# Patient Record
Sex: MALE | Race: WHITE | NOT HISPANIC OR LATINO | Employment: FULL TIME | URBAN - NONMETROPOLITAN AREA
[De-identification: names, ages, dates, MRNs, and addresses within clinical notes are randomized per-mention and may not be internally consistent; named-entity substitution may affect disease eponyms.]

---

## 2019-02-22 ENCOUNTER — TRANSCRIBE ORDERS (OUTPATIENT)
Dept: ADMINISTRATIVE | Facility: HOSPITAL | Age: 49
End: 2019-02-22

## 2019-02-22 DIAGNOSIS — M51.36 DEGENERATION OF LUMBAR INTERVERTEBRAL DISC: Primary | ICD-10-CM

## 2019-02-26 ENCOUNTER — APPOINTMENT (OUTPATIENT)
Dept: MRI IMAGING | Facility: HOSPITAL | Age: 49
End: 2019-02-26

## 2021-04-18 ENCOUNTER — HOSPITAL ENCOUNTER (INPATIENT)
Facility: HOSPITAL | Age: 51
LOS: 2 days | Discharge: HOME | End: 2021-04-21
Attending: FAMILY MEDICINE | Admitting: SURGERY

## 2021-04-18 ENCOUNTER — APPOINTMENT (OUTPATIENT)
Dept: CT IMAGING | Facility: HOSPITAL | Age: 51
End: 2021-04-18

## 2021-04-18 DIAGNOSIS — K56.609 SMALL BOWEL OBSTRUCTION (HCC): Primary | ICD-10-CM

## 2021-04-18 LAB
ALBUMIN SERPL-MCNC: 3.93 G/DL (ref 3.5–5.2)
ALBUMIN/GLOB SERPL: 1.1 G/DL
ALP SERPL-CCNC: 75 U/L (ref 39–117)
ALT SERPL W P-5'-P-CCNC: 26 U/L (ref 1–41)
ANION GAP SERPL CALCULATED.3IONS-SCNC: 8.8 MMOL/L (ref 5–15)
AST SERPL-CCNC: 22 U/L (ref 1–40)
BASOPHILS # BLD AUTO: 0.05 10*3/MM3 (ref 0–0.2)
BASOPHILS NFR BLD AUTO: 0.5 % (ref 0–1.5)
BILIRUB SERPL-MCNC: 1 MG/DL (ref 0–1.2)
BUN SERPL-MCNC: 11 MG/DL (ref 6–20)
BUN/CREAT SERPL: 9.6 (ref 7–25)
CALCIUM SPEC-SCNC: 9.1 MG/DL (ref 8.6–10.5)
CHLORIDE SERPL-SCNC: 102 MMOL/L (ref 98–107)
CO2 SERPL-SCNC: 30.2 MMOL/L (ref 22–29)
CREAT SERPL-MCNC: 1.15 MG/DL (ref 0.76–1.27)
DEPRECATED RDW RBC AUTO: 42.6 FL (ref 37–54)
EOSINOPHIL # BLD AUTO: 0.12 10*3/MM3 (ref 0–0.4)
EOSINOPHIL NFR BLD AUTO: 1.2 % (ref 0.3–6.2)
ERYTHROCYTE [DISTWIDTH] IN BLOOD BY AUTOMATED COUNT: 13.5 % (ref 12.3–15.4)
FLUAV RNA RESP QL NAA+PROBE: NOT DETECTED
FLUBV RNA RESP QL NAA+PROBE: NOT DETECTED
GFR SERPL CREATININE-BSD FRML MDRD: 68 ML/MIN/1.73
GLOBULIN UR ELPH-MCNC: 3.5 GM/DL
GLUCOSE SERPL-MCNC: 106 MG/DL (ref 65–99)
HCT VFR BLD AUTO: 53 % (ref 37.5–51)
HGB BLD-MCNC: 17.2 G/DL (ref 13–17.7)
HOLD SPECIMEN: NORMAL
HOLD SPECIMEN: NORMAL
IMM GRANULOCYTES # BLD AUTO: 0.03 10*3/MM3 (ref 0–0.05)
IMM GRANULOCYTES NFR BLD AUTO: 0.3 % (ref 0–0.5)
INR PPP: 0.97 (ref 0.9–1.1)
LIPASE SERPL-CCNC: 19 U/L (ref 13–60)
LYMPHOCYTES # BLD AUTO: 1.43 10*3/MM3 (ref 0.7–3.1)
LYMPHOCYTES NFR BLD AUTO: 14.3 % (ref 19.6–45.3)
MCH RBC QN AUTO: 28.1 PG (ref 26.6–33)
MCHC RBC AUTO-ENTMCNC: 32.5 G/DL (ref 31.5–35.7)
MCV RBC AUTO: 86.6 FL (ref 79–97)
MONOCYTES # BLD AUTO: 0.67 10*3/MM3 (ref 0.1–0.9)
MONOCYTES NFR BLD AUTO: 6.7 % (ref 5–12)
NEUTROPHILS NFR BLD AUTO: 7.72 10*3/MM3 (ref 1.7–7)
NEUTROPHILS NFR BLD AUTO: 77 % (ref 42.7–76)
NRBC BLD AUTO-RTO: 0 /100 WBC (ref 0–0.2)
PLATELET # BLD AUTO: 265 10*3/MM3 (ref 140–450)
PMV BLD AUTO: 10.4 FL (ref 6–12)
POTASSIUM SERPL-SCNC: 3.6 MMOL/L (ref 3.5–5.2)
PROT SERPL-MCNC: 7.4 G/DL (ref 6–8.5)
PROTHROMBIN TIME: 12.7 SECONDS (ref 11.9–14.1)
RBC # BLD AUTO: 6.12 10*6/MM3 (ref 4.14–5.8)
SARS-COV-2 RNA RESP QL NAA+PROBE: NOT DETECTED
SODIUM SERPL-SCNC: 141 MMOL/L (ref 136–145)
WBC # BLD AUTO: 10.02 10*3/MM3 (ref 3.4–10.8)
WHOLE BLOOD HOLD SPECIMEN: NORMAL
WHOLE BLOOD HOLD SPECIMEN: NORMAL

## 2021-04-18 PROCEDURE — 99284 EMERGENCY DEPT VISIT MOD MDM: CPT

## 2021-04-18 PROCEDURE — 85025 COMPLETE CBC W/AUTO DIFF WBC: CPT | Performed by: FAMILY MEDICINE

## 2021-04-18 PROCEDURE — 85610 PROTHROMBIN TIME: CPT | Performed by: FAMILY MEDICINE

## 2021-04-18 PROCEDURE — 25010000002 IOPAMIDOL 61 % SOLUTION: Performed by: FAMILY MEDICINE

## 2021-04-18 PROCEDURE — 83690 ASSAY OF LIPASE: CPT | Performed by: FAMILY MEDICINE

## 2021-04-18 PROCEDURE — 80053 COMPREHEN METABOLIC PANEL: CPT | Performed by: FAMILY MEDICINE

## 2021-04-18 PROCEDURE — 25010000002 ONDANSETRON PER 1 MG: Performed by: FAMILY MEDICINE

## 2021-04-18 PROCEDURE — 74177 CT ABD & PELVIS W/CONTRAST: CPT

## 2021-04-18 PROCEDURE — 87636 SARSCOV2 & INF A&B AMP PRB: CPT | Performed by: FAMILY MEDICINE

## 2021-04-18 PROCEDURE — 36415 COLL VENOUS BLD VENIPUNCTURE: CPT

## 2021-04-18 RX ORDER — ONDANSETRON 2 MG/ML
4 INJECTION INTRAMUSCULAR; INTRAVENOUS ONCE
Status: COMPLETED | OUTPATIENT
Start: 2021-04-18 | End: 2021-04-18

## 2021-04-18 RX ORDER — SODIUM CHLORIDE 0.9 % (FLUSH) 0.9 %
10 SYRINGE (ML) INJECTION AS NEEDED
Status: DISCONTINUED | OUTPATIENT
Start: 2021-04-18 | End: 2021-04-21 | Stop reason: HOSPADM

## 2021-04-18 RX ADMIN — ONDANSETRON 4 MG: 2 INJECTION INTRAMUSCULAR; INTRAVENOUS at 21:57

## 2021-04-18 RX ADMIN — SODIUM CHLORIDE 500 ML: 9 INJECTION, SOLUTION INTRAVENOUS at 21:43

## 2021-04-18 RX ADMIN — IOPAMIDOL 88 ML: 612 INJECTION, SOLUTION INTRAVENOUS at 23:27

## 2021-04-19 ENCOUNTER — APPOINTMENT (OUTPATIENT)
Dept: CARDIOLOGY | Facility: HOSPITAL | Age: 51
End: 2021-04-19

## 2021-04-19 PROBLEM — K56.609 SMALL BOWEL OBSTRUCTION (HCC): Status: ACTIVE | Noted: 2021-04-19

## 2021-04-19 LAB
ANION GAP SERPL CALCULATED.3IONS-SCNC: 8.7 MMOL/L (ref 5–15)
BH CV ECHO MEAS - ACS: 2.9 CM
BH CV ECHO MEAS - AO ROOT AREA (BSA CORRECTED): 1.4
BH CV ECHO MEAS - AO ROOT AREA: 9.9 CM^2
BH CV ECHO MEAS - AO ROOT DIAM: 3.6 CM
BH CV ECHO MEAS - BSA(HAYCOCK): 2.7 M^2
BH CV ECHO MEAS - BSA: 2.6 M^2
BH CV ECHO MEAS - BZI_BMI: 35.3 KILOGRAMS/M^2
BH CV ECHO MEAS - BZI_METRIC_HEIGHT: 193 CM
BH CV ECHO MEAS - BZI_METRIC_WEIGHT: 131.5 KG
BH CV ECHO MEAS - EDV(CUBED): 64 ML
BH CV ECHO MEAS - EDV(MOD-SP4): 107 ML
BH CV ECHO MEAS - EDV(TEICH): 70 ML
BH CV ECHO MEAS - EF(CUBED): -24.2 %
BH CV ECHO MEAS - EF(MOD-SP4): 20.7 %
BH CV ECHO MEAS - EF(TEICH): -18.7 %
BH CV ECHO MEAS - ESV(CUBED): 79.5 ML
BH CV ECHO MEAS - ESV(MOD-SP4): 84.9 ML
BH CV ECHO MEAS - ESV(TEICH): 83.1 ML
BH CV ECHO MEAS - FS: -7.5 %
BH CV ECHO MEAS - IVS/LVPW: 1
BH CV ECHO MEAS - IVSD: 1.6 CM
BH CV ECHO MEAS - LA DIMENSION: 4.7 CM
BH CV ECHO MEAS - LA/AO: 1.3
BH CV ECHO MEAS - LV DIASTOLIC VOL/BSA (35-75): 41.2 ML/M^2
BH CV ECHO MEAS - LV MASS(C)D: 257.9 GRAMS
BH CV ECHO MEAS - LV MASS(C)DI: 99.4 GRAMS/M^2
BH CV ECHO MEAS - LV SYSTOLIC VOL/BSA (12-30): 32.7 ML/M^2
BH CV ECHO MEAS - LVIDD: 4 CM
BH CV ECHO MEAS - LVIDS: 4.3 CM
BH CV ECHO MEAS - LVLD AP4: 8.5 CM
BH CV ECHO MEAS - LVLS AP4: 8.2 CM
BH CV ECHO MEAS - LVOT AREA (M): 4.5 CM^2
BH CV ECHO MEAS - LVOT AREA: 4.5 CM^2
BH CV ECHO MEAS - LVOT DIAM: 2.4 CM
BH CV ECHO MEAS - LVPWD: 1.6 CM
BH CV ECHO MEAS - MV A MAX VEL: 52.7 CM/SEC
BH CV ECHO MEAS - MV E MAX VEL: 86.1 CM/SEC
BH CV ECHO MEAS - MV E/A: 1.6
BH CV ECHO MEAS - PA ACC TIME: 0.06 SEC
BH CV ECHO MEAS - PA PR(ACCEL): 52 MMHG
BH CV ECHO MEAS - SI(CUBED): -6 ML/M^2
BH CV ECHO MEAS - SI(MOD-SP4): 8.5 ML/M^2
BH CV ECHO MEAS - SI(TEICH): -5 ML/M^2
BH CV ECHO MEAS - SV(CUBED): -15.5 ML
BH CV ECHO MEAS - SV(MOD-SP4): 22.1 ML
BH CV ECHO MEAS - SV(TEICH): -13.1 ML
BILIRUB UR QL STRIP: ABNORMAL
BUN SERPL-MCNC: 10 MG/DL (ref 6–20)
BUN/CREAT SERPL: 9.8 (ref 7–25)
CALCIUM SPEC-SCNC: 8.4 MG/DL (ref 8.6–10.5)
CHLORIDE SERPL-SCNC: 107 MMOL/L (ref 98–107)
CLARITY UR: CLEAR
CO2 SERPL-SCNC: 24.3 MMOL/L (ref 22–29)
COLOR UR: ABNORMAL
CREAT SERPL-MCNC: 1.02 MG/DL (ref 0.76–1.27)
D-LACTATE SERPL-SCNC: 0.9 MMOL/L (ref 0.5–2)
DEPRECATED RDW RBC AUTO: 43.7 FL (ref 37–54)
ERYTHROCYTE [DISTWIDTH] IN BLOOD BY AUTOMATED COUNT: 13.7 % (ref 12.3–15.4)
GFR SERPL CREATININE-BSD FRML MDRD: 78 ML/MIN/1.73
GLUCOSE SERPL-MCNC: 103 MG/DL (ref 65–99)
GLUCOSE UR STRIP-MCNC: NEGATIVE MG/DL
HCT VFR BLD AUTO: 48.5 % (ref 37.5–51)
HGB BLD-MCNC: 15.9 G/DL (ref 13–17.7)
HGB UR QL STRIP.AUTO: NEGATIVE
KETONES UR QL STRIP: ABNORMAL
LEUKOCYTE ESTERASE UR QL STRIP.AUTO: NEGATIVE
MAXIMAL PREDICTED HEART RATE: 172 BPM
MCH RBC QN AUTO: 28.8 PG (ref 26.6–33)
MCHC RBC AUTO-ENTMCNC: 32.8 G/DL (ref 31.5–35.7)
MCV RBC AUTO: 87.7 FL (ref 79–97)
NITRITE UR QL STRIP: NEGATIVE
PH UR STRIP.AUTO: 5.5 [PH] (ref 5–8)
PLATELET # BLD AUTO: 379 10*3/MM3 (ref 140–450)
PMV BLD AUTO: 11.7 FL (ref 6–12)
POTASSIUM SERPL-SCNC: 3.6 MMOL/L (ref 3.5–5.2)
PROT UR QL STRIP: NEGATIVE
QT INTERVAL: 358 MS
QTC INTERVAL: 397 MS
RBC # BLD AUTO: 5.53 10*6/MM3 (ref 4.14–5.8)
SODIUM SERPL-SCNC: 140 MMOL/L (ref 136–145)
SP GR UR STRIP: >1.03 (ref 1–1.03)
STRESS TARGET HR: 146 BPM
UROBILINOGEN UR QL STRIP: ABNORMAL
WBC # BLD AUTO: 8.57 10*3/MM3 (ref 3.4–10.8)

## 2021-04-19 PROCEDURE — 94799 UNLISTED PULMONARY SVC/PX: CPT

## 2021-04-19 PROCEDURE — 81003 URINALYSIS AUTO W/O SCOPE: CPT | Performed by: FAMILY MEDICINE

## 2021-04-19 PROCEDURE — 94660 CPAP INITIATION&MGMT: CPT

## 2021-04-19 PROCEDURE — 99223 1ST HOSP IP/OBS HIGH 75: CPT | Performed by: SURGERY

## 2021-04-19 PROCEDURE — 93306 TTE W/DOPPLER COMPLETE: CPT | Performed by: INTERNAL MEDICINE

## 2021-04-19 PROCEDURE — 93306 TTE W/DOPPLER COMPLETE: CPT

## 2021-04-19 PROCEDURE — 85027 COMPLETE CBC AUTOMATED: CPT | Performed by: SURGERY

## 2021-04-19 PROCEDURE — 99254 IP/OBS CNSLTJ NEW/EST MOD 60: CPT | Performed by: SPECIALIST

## 2021-04-19 PROCEDURE — 25010000002 HEPARIN (PORCINE) PER 1000 UNITS: Performed by: SURGERY

## 2021-04-19 PROCEDURE — 25010000002 VANCOMYCIN 5 G RECONSTITUTED SOLUTION: Performed by: FAMILY MEDICINE

## 2021-04-19 PROCEDURE — 87040 BLOOD CULTURE FOR BACTERIA: CPT | Performed by: FAMILY MEDICINE

## 2021-04-19 PROCEDURE — 93005 ELECTROCARDIOGRAM TRACING: CPT | Performed by: SURGERY

## 2021-04-19 PROCEDURE — 80048 BASIC METABOLIC PNL TOTAL CA: CPT | Performed by: SURGERY

## 2021-04-19 PROCEDURE — 83605 ASSAY OF LACTIC ACID: CPT | Performed by: FAMILY MEDICINE

## 2021-04-19 RX ORDER — MORPHINE SULFATE 2 MG/ML
2 INJECTION, SOLUTION INTRAMUSCULAR; INTRAVENOUS EVERY 4 HOURS PRN
Status: DISCONTINUED | OUTPATIENT
Start: 2021-04-19 | End: 2021-04-21 | Stop reason: HOSPADM

## 2021-04-19 RX ORDER — GUSELKUMAB 100 MG/ML
100 INJECTION SUBCUTANEOUS TAKE AS DIRECTED
COMMUNITY
Start: 2021-04-21

## 2021-04-19 RX ORDER — SODIUM CHLORIDE, SODIUM LACTATE, POTASSIUM CHLORIDE, CALCIUM CHLORIDE 600; 310; 30; 20 MG/100ML; MG/100ML; MG/100ML; MG/100ML
125 INJECTION, SOLUTION INTRAVENOUS CONTINUOUS
Status: DISCONTINUED | OUTPATIENT
Start: 2021-04-19 | End: 2021-04-21 | Stop reason: HOSPADM

## 2021-04-19 RX ORDER — ONDANSETRON 2 MG/ML
4 INJECTION INTRAMUSCULAR; INTRAVENOUS EVERY 8 HOURS PRN
Status: DISCONTINUED | OUTPATIENT
Start: 2021-04-19 | End: 2021-04-21 | Stop reason: HOSPADM

## 2021-04-19 RX ORDER — HEPARIN SODIUM 5000 [USP'U]/ML
5000 INJECTION, SOLUTION INTRAVENOUS; SUBCUTANEOUS EVERY 8 HOURS SCHEDULED
Status: DISCONTINUED | OUTPATIENT
Start: 2021-04-19 | End: 2021-04-20

## 2021-04-19 RX ADMIN — HEPARIN SODIUM 5000 UNITS: 5000 INJECTION INTRAVENOUS; SUBCUTANEOUS at 20:25

## 2021-04-19 RX ADMIN — METRONIDAZOLE 500 MG: 500 INJECTION, SOLUTION INTRAVENOUS at 03:36

## 2021-04-19 RX ADMIN — SODIUM CHLORIDE 2 G: 900 INJECTION INTRAVENOUS at 17:33

## 2021-04-19 RX ADMIN — METRONIDAZOLE 500 MG: 500 INJECTION, SOLUTION INTRAVENOUS at 10:08

## 2021-04-19 RX ADMIN — HEPARIN SODIUM 5000 UNITS: 5000 INJECTION INTRAVENOUS; SUBCUTANEOUS at 08:45

## 2021-04-19 RX ADMIN — SODIUM CHLORIDE, POTASSIUM CHLORIDE, SODIUM LACTATE AND CALCIUM CHLORIDE 125 ML/HR: 600; 310; 30; 20 INJECTION, SOLUTION INTRAVENOUS at 01:12

## 2021-04-19 RX ADMIN — SODIUM CHLORIDE 2 G: 900 INJECTION INTRAVENOUS at 08:29

## 2021-04-19 RX ADMIN — AZTREONAM 2 G: 2 INJECTION, POWDER, FOR SOLUTION INTRAMUSCULAR; INTRAVENOUS at 01:12

## 2021-04-19 RX ADMIN — METRONIDAZOLE 500 MG: 500 INJECTION, SOLUTION INTRAVENOUS at 18:08

## 2021-04-19 RX ADMIN — HEPARIN SODIUM 5000 UNITS: 5000 INJECTION INTRAVENOUS; SUBCUTANEOUS at 14:02

## 2021-04-19 RX ADMIN — VANCOMYCIN HYDROCHLORIDE 2000 MG: 5 INJECTION, POWDER, LYOPHILIZED, FOR SOLUTION INTRAVENOUS at 03:35

## 2021-04-19 NOTE — PAYOR COMM NOTE
"CONTACT:  Maria A Heaton RN    Utilization Management Dept.   [facility]  [address] [place] [address]    Phone:[phone]  Fax: [phone]    REQUEST FOR INPATIENT AUTHORIZATION    Ref#f [id number]      Donavon Colby (50 y.o. Male)     Date of Birth Social Security Number Address [place] Phone MRN    1970  [address] [facility] [address] [phone] [id number]    [employer] Marital Status          Islam        Admission Date Admission Type Admitting Provider Attending Provider Department, Room/Bed    21 Emergency Iván Cat MD House, Aaron Bradley, MD [place] [id number]/    Discharge Date Discharge Disposition Discharge Destination                       Attending Provider: Iván Cat MD    Allergies: Penicillins    Isolation: None   Infection: None   Code Status: CPR    Ht: 193 cm (75.98\")   Wt: 129 kg (283 lb 14.4 oz)    Admission Cmt: None   Principal Problem: None                Active Insurance as of 2021     Primary Coverage     Payor Plan Insurance Group Employer/Plan Group    [facility] [employer] EMPLOYEE [id number]     Payor Plan Address Payor Plan Phone Number Payor Plan Fax Number Effective Dates    [address] [phone]  2015 - None Entered    [place] [address]       Subscriber Name Subscriber Birth Date Member ID       DONAVON COLBY 1970 [id number]                 Emergency Contacts      (Rel.) Home Phone Work Phone Mobile Phone    torin colby (Daughter) [phone] -- --               History & Physical      Iván Cat MD at 21 0720            [facility]   HISTORY AND PHYSICAL    Patient Name: Donavon Colby  : 1970  MRN: [id number]  Primary Care Physician: Sintia Ly MD  Date of admission: 2021    Subjective   Subjective     Chief Complaint: Abdominal pain    50-year-old male presenting with 2 to 3-day history " of abdominal pain that is crampy in nature with severe diarrhea.  Diarrhea has lessened but his crampy abdominal pain persisted and localized to the right lower quadrant so he came to the emergency department to be evaluated for possible appendicitis.  He states he did have sick contacts with recent GI illness but denies any contact with livestock or unpasteurized milk or contaminated water sources.  On CT the abdomen pelvis the patient has some distal ileal thickening but no significant free fluid or inflammatory change.  No nausea or vomiting reported currently.  He states last night his cramping did ease up quite a bit.  Unfortunately he did develop a run of atrial fibrillation and reports previously known history of arrhythmia but no specific diagnosis.  No chest pain or shortness of breath reported.  Heart rate is in the 70s.      Review of Systems   Constitutional: Negative for activity change, appetite change, chills and fever.   HENT: Negative for sore throat and trouble swallowing.    Eyes: Negative for visual disturbance.   Respiratory: Negative for cough and shortness of breath.    Cardiovascular: Negative for chest pain and palpitations.   Gastrointestinal: Positive for abdominal pain, diarrhea and nausea. Negative for abdominal distention, blood in stool, constipation and vomiting.   Endocrine: Negative for cold intolerance and heat intolerance.   Genitourinary: Negative for dysuria.   Musculoskeletal: Negative for joint swelling.   Skin: Negative for color change, rash and wound.   Allergic/Immunologic: Negative for immunocompromised state.   Neurological: Negative for dizziness, seizures, weakness and headaches.   Hematological: Negative for adenopathy. Does not bruise/bleed easily.   Psychiatric/Behavioral: Negative for agitation and confusion.        Personal History     Past Medical History:   Diagnosis Date   • Sleep apnea        No past surgical history on file.    Family History: family history  is not on file. Otherwise pertinent FHx was reviewed and not pertinent to current issue.    Social History:  reports that he has never smoked. He has never used smokeless tobacco. He reports that he does not drink alcohol and does not use drugs.    Home Medications:  Guselkumab      Allergies:  Allergies   Allergen Reactions   • Penicillins Hives       Objective    Objective     Vitals:  Temp:  [97.6 °F (36.4 °C)-98.4 °F (36.9 °C)] 98.4 °F (36.9 °C)  Heart Rate:  [67-88] 67  Resp:  [18] 18  BP: (118-165)/() 118/62    Physical Exam  Constitutional:       Appearance: He is well-developed.   HENT:      Head: Normocephalic and atraumatic.   Eyes:      Conjunctiva/sclera: Conjunctivae normal.      Pupils: Pupils are equal, round, and reactive to light.   Neck:      Thyroid: No thyromegaly.      Vascular: No JVD.      Trachea: No tracheal deviation.   Cardiovascular:      Rate and Rhythm: Normal rate and regular rhythm.      Heart sounds: No murmur heard.   No friction rub. No gallop.    Pulmonary:      Effort: Pulmonary effort is normal.      Breath sounds: Normal breath sounds.   Abdominal:      General: There is no distension.      Palpations: Abdomen is soft. There is no hepatomegaly or splenomegaly.      Tenderness: There is no abdominal tenderness.      Hernia: No hernia is present.   Musculoskeletal:         General: No deformity. Normal range of motion.      Cervical back: Neck supple.   Skin:     General: Skin is warm and dry.   Neurological:      Mental Status: He is alert and oriented to person, place, and time.         Result Review    Result Review:  I have personally reviewed the results from the time of this admission to 04/19/21 7:20 AM EDT and agree with these findings:  [x]  Laboratory  []  Microbiology  [x]  Radiology  []  EKG/Telemetry   []  Cardiology/Vascular   []  Pathology  []  Old records  []  Other:  Most notable findings include: Improving abdominal pain, recent atrial fibrillation on EKG  and telemetry    Assessment/Plan   Assessment / Plan     Brief Patient Summary:  Lloyd Loza is a 50 y.o. male who presents with abdominal pain likely representing gastroenteritis.  He has distal ileal thickening but no significant history of previous symptoms or family history of inflammatory bowel disease.  He does not clinically appear to be obstructed and states his pain is improving.  He unfortunately developed some atrial fibrillation without significant ventricular response and is hemodynamically stable.    Active Hospital Problems:  Active Hospital Problems    Diagnosis    • Small bowel obstruction (CMS/HCC)        Plan:   Cardiology consult to assess atrial fibrillation  Continue IV fluids and antibiotic  Stool cultures to evaluate for possible bacterial source of enteritis  Continue bowel rest for now    DVT prophylaxis: Subcutaneous heparin    CODE STATUS:    Code Status and Medical Interventions:   Ordered at: 04/19/21 0135     Level Of Support Discussed With:    Patient     Code Status:    CPR     Medical Interventions (Level of Support Prior to Arrest):    Full       Admission Status:  I believe this patient meets inpatient status.    Electronically signed by Iván Cat MD, 04/19/21, 7:20 AM EDT.    Electronically signed by Iván Cat MD at 04/19/21 0722         Lab Results (last 24 hours)     Procedure Component Value Units Date/Time    Basic Metabolic Panel [[id number]]  (Abnormal) Collected: 04/19/21 0719    Specimen: Blood Updated: 04/19/21 0801     Glucose 103 mg/dL      BUN 10 mg/dL      Creatinine 1.02 mg/dL      Sodium 140 mmol/L      Potassium 3.6 mmol/L      Chloride 107 mmol/L      CO2 24.3 mmol/L      Calcium 8.4 mg/dL      eGFR Non African Amer 78 mL/min/1.73      BUN/Creatinine Ratio 9.8     Anion Gap 8.7 mmol/L     Narrative:      GFR Normal >60  Chronic Kidney Disease <60  Kidney Failure <15      CBC (No Diff) [[id number]]  (Normal) Collected: 04/19/21 0719     Specimen: Blood Updated: 04/19/21 0740     WBC 8.57 10*3/mm3      RBC 5.53 10*6/mm3      Hemoglobin 15.9 g/dL      Hematocrit 48.5 %      MCV 87.7 fL      MCH 28.8 pg      MCHC 32.8 g/dL      RDW 13.7 %      RDW-SD 43.7 fl      MPV 11.7 fL      Platelets 379 10*3/mm3     Urinalysis With Microscopic If Indicated (No Culture) - Urine, Clean Catch [[id number]]  (Abnormal) Collected: 04/19/21 0642    Specimen: Urine, Clean Catch Updated: 04/19/21 0658     Color, UA Dark Yellow     Appearance, UA Clear     pH, UA 5.5     Specific Gravity, UA >1.030     Glucose, UA Negative     Ketones, UA Trace     Bilirubin, UA Small (1+)     Blood, UA Negative     Protein, UA Negative     Leuk Esterase, UA Negative     Nitrite, UA Negative     Urobilinogen, UA 1.0 E.U./dL    Narrative:      Urine microscopic not indicated.    Lactic Acid, Plasma [[id number]]  (Normal) Collected: 04/19/21 0105    Specimen: Blood from Arm, Right Updated: 04/19/21 0126     Lactate 0.9 mmol/L     Blood Culture - Blood, Arm, Right [[id number]] Collected: 04/19/21 0105    Specimen: Blood from Arm, Right Updated: 04/19/21 0109    Blood Culture - Blood, Hand, Right [[id number]] Collected: 04/19/21 0106    Specimen: Blood from Hand, Right Updated: 04/19/21 0109    COVID-19 and FLU A/B PCR - Swab, Nasopharynx [[id number]]  (Normal) Collected: 04/18/21 2236    Specimen: Swab from Nasopharynx Updated: 04/18/21 2300     COVID19 Not Detected     Influenza A PCR Not Detected     Influenza B PCR Not Detected    Narrative:      Fact sheet for providers: https://www.fda.gov/media/968996/download    Fact sheet for patients: https://www.fda.gov/media/497168/download    Test performed by PCR.    [place] Draw [[id number]] Collected: 04/18/21 2142    Specimen: Blood Updated: 04/18/21 2245    Narrative:      The following orders were created for panel order [place] Draw.  Procedure                               Abnormality         Status                     ---------                                -----------         ------                     Light Blue Top[[id number]]                                   Final result               Green Top (Gel)[[id number]]                                  Final result               Lavender Top[[id number]]                                     Final result               Gold Top - SST[[id number]]                                   Final result                 Please view results for these tests on the individual orders.    Gold Top - SST [[id number]] Collected: 04/18/21 2142    Specimen: Blood Updated: 04/18/21 2245     Extra Tube Hold for add-ons.     Comment: Auto resulted.       Light Blue Top [[id number]] Collected: 04/18/21 2142    Specimen: Blood Updated: 04/18/21 2245     Extra Tube hold for add-on     Comment: Auto resulted       Lavender Top [[id number]] Collected: 04/18/21 2142    Specimen: Blood Updated: 04/18/21 2245     Extra Tube hold for add-on     Comment: Auto resulted       Green Top (Gel) [[id number]] Collected: 04/18/21 2142    Specimen: Blood Updated: 04/18/21 2245     Extra Tube Hold for add-ons.     Comment: Auto resulted.       Comprehensive Metabolic Panel [[id number]]  (Abnormal) Collected: 04/18/21 2142    Specimen: Blood Updated: 04/18/21 2214     Glucose 106 mg/dL      BUN 11 mg/dL      Creatinine 1.15 mg/dL      Sodium 141 mmol/L      Potassium 3.6 mmol/L      Chloride 102 mmol/L      CO2 30.2 mmol/L      Calcium 9.1 mg/dL      Total Protein 7.4 g/dL      Albumin 3.93 g/dL      ALT (SGPT) 26 U/L      AST (SGOT) 22 U/L      Alkaline Phosphatase 75 U/L      Total Bilirubin 1.0 mg/dL      eGFR Non African Amer 68 mL/min/1.73      Globulin 3.5 gm/dL      A/G Ratio 1.1 g/dL      BUN/Creatinine Ratio 9.6     Anion Gap 8.8 mmol/L     Narrative:      GFR Normal >60  Chronic Kidney Disease <60  Kidney Failure <15      Lipase [[id number]]  (Normal) Collected: 04/18/21 2142    Specimen: Blood Updated: 04/18/21 2214     Lipase 19 U/L     Protime-INR  [[id number]]  (Normal) Collected: 04/18/21 2142    Specimen: Blood Updated: 04/18/21 2157     Protime 12.7 Seconds      Comment: Note new Reference Range        INR 0.97    Narrative:      Suggested INR therapeutic range for stable oral anticoagulant therapy:    Low Intensity therapy:   1.5-2.0  Moderate Intensity therapy:   2.0-3.0  High Intensity therapy:   2.5-4.0    CBC & Differential [[id number]]  (Abnormal) Collected: 04/18/21 2142    Specimen: Blood Updated: 04/18/21 2148    Narrative:      The following orders were created for panel order CBC & Differential.  Procedure                               Abnormality         Status                     ---------                               -----------         ------                     CBC Auto Differential[[id number]]        Abnormal            Final result                 Please view results for these tests on the individual orders.    CBC Auto Differential [[id number]]  (Abnormal) Collected: 04/18/21 2142    Specimen: Blood Updated: 04/18/21 2148     WBC 10.02 10*3/mm3      RBC 6.12 10*6/mm3      Hemoglobin 17.2 g/dL      Hematocrit 53.0 %      MCV 86.6 fL      MCH 28.1 pg      MCHC 32.5 g/dL      RDW 13.5 %      RDW-SD 42.6 fl      MPV 10.4 fL      Platelets 265 10*3/mm3      Neutrophil % 77.0 %      Lymphocyte % 14.3 %      Monocyte % 6.7 %      Eosinophil % 1.2 %      Basophil % 0.5 %      Immature Grans % 0.3 %      Neutrophils, Absolute 7.72 10*3/mm3      Lymphocytes, Absolute 1.43 10*3/mm3      Monocytes, Absolute 0.67 10*3/mm3      Eosinophils, Absolute 0.12 10*3/mm3      Basophils, Absolute 0.05 10*3/mm3      Immature Grans, Absolute 0.03 10*3/mm3      nRBC 0.0 /100 WBC         Imaging Results (Last 24 Hours)     Procedure Component Value Units Date/Time    CT Abdomen Pelvis With Contrast [[id number]] Collected: 04/18/21 2357     Updated: 04/18/21 2359    Narrative:      CT Abdomen Pelvis W    INDICATION:   Right lower quadrant pain for the last several  days.    TECHNIQUE:   CT of the abdomen and pelvis with IV contrast. Coronal and sagittal reconstructions were obtained.  Radiation dose reduction techniques included automated exposure control or exposure modulation based on body size. Count of known CT and cardiac nuc med  studies performed in previous 12 months: 0.     COMPARISON:   None available.    FINDINGS:  There is mild atelectasis in the lower lobes. Abdominal aorta is normal in caliber. Gallbladder is normal. No biliary obstruction is seen. There is a tiny nonobstructing right-sided kidney stone. No ureteral stones are seen on either side, and there is  no hydronephrosis. There is mild hepatic steatosis. Solid organs are otherwise within normal limits. There is some mild thickening of the urinary bladder wall. Correlate with urinalysis results.    The appendix is normal. There is colonic diverticulosis without acute diverticulitis. There is some prominence of the pelvic fat. There is thickening of the distal ileum in the right lower quadrant compatible with ileitis. These findings may be the  result of inflammatory bowel disease such as Crohn's. Findings result in a moderate distal small bowel obstruction. Small bowel loops are dilated to nearly 5 cm. No free air is identified. There is no adenopathy. There is a trace amount of free fluid in  the small bowel mesentery.      Impression:      1. Distal ileitis resulting in a small bowel obstruction. Inflammatory bowel disease such as Crohn's is not excluded.  2. Colonic diverticulosis without acute diverticulitis. The appendix is normal.  3. Mild thickening of the urinary bladder wall may indicate cystitis. Correlate with urinalysis.  4. Trace amount free fluid in the small bowel mesentery. No free air.  5. Mild hepatic steatosis.  6. Tiny nonobstructing right kidney stone.          Signer Name: Martin Flores MD   Signed: 4/18/2021 11:57 PM   Workstation Name: [facility]    [facility]        ECG/EMG Results (last 24 hours)     Procedure Component Value Units Date/Time    ECG 12 Lead [[id number]] Collected: 04/19/21 0255     Updated: 04/19/21 0256     QT Interval 358 ms      QTC Interval 397 ms     Narrative:      Test Reason : abnormal heart rhythmn  Blood Pressure : **/** mmHG  Vent. Rate : 074 BPM     Atrial Rate : 071 BPM     P-R Int : 000 ms          QRS Dur : 090 ms      QT Int : 358 ms       P-R-T Axes : 000 045 038 degrees     QTc Int : 397 ms    Atrial fibrillation  Nonspecific T wave abnormality  Abnormal ECG  No previous ECGs available    Referred By:  HOUSE           Confirmed By:             Lloyd Melvin as of 4/17/21 through 4/19/21    1 Day 3 Days 7 Days 10 Days < Today >    Legend:                          Inactive     Active     Other Encounter     Linked                 Medications 04/17/21 04/18/21 04/19/21   aztreonam (AZACTAM) 2 g/100 mL 0.9% NS (mbp)   Dose: 2 g  Freq: Every 8 Hours Route: IV  Indications of Use: INTRA-ABDOMINAL INFECTION  Start: 04/19/21 0900 End: 04/22/21 0859    Admin Instructions:   Break seal and mix to activate vial before use      0900   1700          aztreonam (AZACTAM) 2 g/100 mL 0.9% NS (mbp)   Dose: 2 g  Freq: Once Route: IV  Indications of Use: SEPSIS  Start: 04/19/21 0013 End: 04/19/21 0142    Admin Instructions:   Break seal and mix to activate vial before use      0112            heparin (porcine) 5000 UNIT/ML injection 5,000 Units   Dose: 5,000 Units  Freq: Every 8 Hours Scheduled Route: SC  Indications Comment: Prophylaxis of Venous Thromboembolism  Start: 04/19/21 0800      0800   1400   2200        iopamidol (ISOVUE-300) 61 % injection 100 mL   Dose: 100 mL  Freq: Once in Imaging Route: IV  Start: 04/18/21 2328 End: 04/18/21 2327 2327             metroNIDAZOLE (FLAGYL) 500 mg/100mL IVPB   Dose: 500 mg  Freq: Every 8 Hours Route: IV  Indications of Use: INTRA-ABDOMINAL INFECTION  Start: 04/19/21 1100 End: 04/22/21 1059     Admin Instructions:   Caution: Look alike/sound alike drug alert. Do not refrigerate.      1100   1900          metroNIDAZOLE (FLAGYL) 500 mg/100mL IVPB   Dose: 500 mg  Freq: Once Route: IV  Indications of Use: SEPSIS  Start: 04/19/21 0013 End: 04/19/21 0436    Admin Instructions:   Caution: Look alike/sound alike drug alert. Do not refrigerate.      0336 [C]            ondansetron (ZOFRAN) injection 4 mg   Dose: 4 mg  Freq: Once Route: IV  Start: 04/18/21 2124 End: 04/18/21 2157 2157             sodium chloride 0.9 % bolus 500 mL   Dose: 500 mL  Freq: Once Route: IV  Last Dose: Stopped (04/18/21 2215)  Start: 04/18/21 2124 End: 04/18/21 2215 2143 2215           vancomycin 2000 mg/500 mL 0.9% NS IVPB ([facility])   Dose: 2,000 mg  Freq: Once Route: IV  Indications of Use: SEPSIS  Start: 04/19/21 0013 End: 04/19/21 0535      0335 [C]            Medications 04/17/21 04/18/21 04/19/21       Continuous Meds Sorted by Name  for Lloyd Loza as of 4/17/21 through 4/19/21   Legend:                          Inactive     Active     Other Encounter     Linked                 Medications 04/17/21 04/18/21 04/19/21   lactated ringers infusion   Rate: 125 mL/hr Dose: 125 mL/hr  Freq: Continuous Route: IV  Last Dose: 125 mL/hr (04/19/21 0112)  Start: 04/19/21 0015      0112

## 2021-04-19 NOTE — PLAN OF CARE
Goal Outcome Evaluation:  Plan of Care Reviewed With: patient  Progress: no change  Outcome Summary: pt admitted to floor tonight. resting in bed. denies any vomiting or diarrhea at this time. NPO status maintained. No complaints or requests at this time.

## 2021-04-19 NOTE — CONSULTS
Date of Admit: 4/18/2021  Date of Consult: 04/19/21  No ref. provider found        Small bowel obstruction (CMS/[facility])      Assessment      1. Atrial fibrillation, rate controlled for unknown duration  2. Obstructive sleep apnea  3. Questionable hypertension  4. Small bowel obstruction possible gastroenteritis  5.   Morbid obesity    Recommendations     1. Regarding atrial fibrillation is unknown duration however the patient said that he was told by several physicians in the past that his heart is beating irregular no action was taken, is not known if he actually does have hypertension or not he said there is no history of hypertension before however his diastolic pressure is elevated during this hospitalization will monitor if his blood pressure is elevated the patient will be required in this case anticoagulation  2. Will probably plan to do cardioversion either as an outpatient after anticoagulation for 3 to 4 weeks versus with DINORAH then anticoagulation both after current cardioversion he would need to be on isolation for minimum of a month  3. Regarding his blood pressure will monitor his blood pressure for now and assess his blood pressure he may need to have medications  4. Continue with the CPAP for the sleep apnea  5. We will get echocardiogram to assess cardiac function wall motion and also atrial size        Reason for consultation: Atrial fibrillation     Subjective       Subjective     History of Present Illness     Lloyd Loza is a 50 year old male with a past medical history significant for sleep apnea. He presented to the ER with complaints of abdominal pain and diarrhea x 2-3 days. He reports having sick contacts with GI illness. Cardiology was consulted for atrial fibrillation. EKG showed atrial fibrillation with a controlled rate. He denies a history of atrial fibrillation but states he has been told in the past he had an irregular heart rhythm and heart murmur. Denies any work up for this. He denies  any chest pain, syncope, palpitations, dizziness or lower extremity edema. States he is a . He does have a history of sleep apnea and wears a C-pap at night. He does report he does not follow with his PCP regularly. He is not a smoker, diabetic or have a history of hypertension. Denies any history of coronary artery disease.     Cardiac risk factors:Sedentary life style and Obesity    Past Medical History:   Diagnosis Date   • Sleep apnea      No past surgical history on file.  No family history on file.  Social History     Tobacco Use   • Smoking status: Never Smoker   • Smokeless tobacco: Never Used   Substance Use Topics   • Alcohol use: Never   • Drug use: Never     Medications Prior to Admission   Medication Sig Dispense Refill Last Dose   • [START ON 4/21/2021] Guselkumab (Tremfya) 100 MG/ML solution pen-injector Inject 100 mg under the skin into the appropriate area as directed Every 30 (Thirty) Days. Prior to [facility] Admission, Patient was on: Pt takes on the 21st of each month   3/21/2021 at Unknown time     Allergies:  Penicillins    Review of Systems   Constitutional: Negative for chills, diaphoresis, fatigue and fever.   HENT: Negative for congestion and trouble swallowing.    Eyes: Negative for photophobia and visual disturbance.   Respiratory: Negative for chest tightness, shortness of breath and wheezing.    Cardiovascular: Negative for chest pain, palpitations and leg swelling.   Gastrointestinal: Positive for abdominal pain and diarrhea. Negative for vomiting.   Endocrine: Negative for polyphagia and polyuria.   Genitourinary: Negative for dysuria and hematuria.   Musculoskeletal: Negative for neck pain and neck stiffness.   Skin: Negative for rash and wound.   Allergic/Immunologic: Negative for food allergies and immunocompromised state.   Neurological: Negative for dizziness, weakness and light-headedness.   Hematological: Negative for adenopathy. Does not bruise/bleed easily.      Psychiatric/Behavioral: Negative for confusion and suicidal ideas.       Objective       Objective      Vital Signs  Temp:  [97.6 °F (36.4 °C)-98.4 °F (36.9 °C)] 98.4 °F (36.9 °C)  Heart Rate:  [67-88] 67  Resp:  [18] 18  BP: (118-165)/() 118/62     Vital Signs (last 72 hrs)       04/16 0700  -  04/17 0659 04/17 0700  -  04/18 0659 04/18 0700  -  04/19 0659 04/19 0700  -  04/19 0905   Most Recent    Temp (°F)     97.6 -  98.4       98.4 (36.9)    Heart Rate     67 -  88       67    Resp       18       18    BP     118/62 -  165/99       118/62    SpO2 (%)     94 -  99       94        Body mass index is 34.57 kg/m².    Intake/Output Summary (Last 24 hours) at 4/19/2021 0905  Last data filed at 4/19/2021 0800  Gross per 24 hour   Intake 500 ml   Output --   Net 500 ml     Physical Exam  Constitutional:       General: He is not in acute distress.     Appearance: Normal appearance. He is well-developed.   HENT:      Head: Normocephalic and atraumatic.      Mouth/Throat:      Mouth: Mucous membranes are moist.   Eyes:      Extraocular Movements: Extraocular movements intact.      Pupils: Pupils are equal, round, and reactive to light.   Neck:      Vascular: No JVD.   Cardiovascular:      Rate and Rhythm: Normal rate. Rhythm irregular.      Pulses:           Dorsalis pedis pulses are 2+ on the right side and 2+ on the left side.        Posterior tibial pulses are 2+ on the right side and 2+ on the left side.      Heart sounds: Normal heart sounds. No murmur heard.   No S3 or S4 sounds.    Pulmonary:      Effort: Pulmonary effort is normal. No respiratory distress.      Breath sounds: Normal breath sounds. No wheezing.   Abdominal:      General: Bowel sounds are normal. There is no distension.      Palpations: Abdomen is soft. There is no hepatomegaly.      Tenderness: There is no abdominal tenderness.   Musculoskeletal:         General: Normal range of motion.      Cervical back: Normal range of motion and neck  supple.   Skin:     General: Skin is warm and dry.      Coloration: Skin is not jaundiced or pale.   Neurological:      General: No focal deficit present.      Mental Status: He is alert and oriented to person, place, and time. Mental status is at baseline.   Psychiatric:         Mood and Affect: Mood normal.         Behavior: Behavior normal.         Thought Content: Thought content normal.         Judgment: Judgment normal.       Results review     Results Review:    I reviewed the patient's new clinical results.      Results from last 7 days   Lab Units 04/19/21  0719 04/18/21  2142   WBC 10*3/mm3 8.57 10.02   HEMOGLOBIN g/dL 15.9 17.2   PLATELETS 10*3/mm3 379 265     Results from last 7 days   Lab Units 04/19/21  0719 04/18/21  2142   SODIUM mmol/L 140 141   POTASSIUM mmol/L 3.6 3.6   CHLORIDE mmol/L 107 102   CO2 mmol/L 24.3 30.2*   BUN mg/dL 10 11   CREATININE mg/dL 1.02 1.15   CALCIUM mg/dL 8.4* 9.1   GLUCOSE mg/dL 103* 106*   ALT (SGPT) U/L  --  26   AST (SGOT) U/L  --  22     Lab Results   Component Value Date    INR 0.97 04/18/2021     ECG  ECG/EMG Results (last 24 hours)     Procedure Component Value Units Date/Time    ECG 12 Lead [[id number]] Collected: 04/19/21 0255     Updated: 04/19/21 0256     QT Interval 358 ms      QTC Interval 397 ms     Narrative:      Test Reason : abnormal heart rhythmn  Blood Pressure : **/** mmHG  Vent. Rate : 074 BPM     Atrial Rate : 071 BPM     P-R Int : 000 ms          QRS Dur : 090 ms      QT Int : 358 ms       P-R-T Axes : 000 045 038 degrees     QTc Int : 397 ms    Atrial fibrillation  Nonspecific T wave abnormality  Abnormal ECG  No previous ECGs available    Referred By:  HOUSE           Confirmed By:           Imaging Results (Last 72 Hours)     Procedure Component Value Units Date/Time    CT Abdomen Pelvis With Contrast [[id number]] Collected: 04/18/21 2357     Updated: 04/18/21 2359    Narrative:      CT Abdomen Pelvis W    INDICATION:   Right lower quadrant pain  for the last several days.    TECHNIQUE:   CT of the abdomen and pelvis with IV contrast. Coronal and sagittal reconstructions were obtained.  Radiation dose reduction techniques included automated exposure control or exposure modulation based on body size. Count of known CT and cardiac nuc med  studies performed in previous 12 months: 0.     COMPARISON:   None available.    FINDINGS:  There is mild atelectasis in the lower lobes. Abdominal aorta is normal in caliber. Gallbladder is normal. No biliary obstruction is seen. There is a tiny nonobstructing right-sided kidney stone. No ureteral stones are seen on either side, and there is  no hydronephrosis. There is mild hepatic steatosis. Solid organs are otherwise within normal limits. There is some mild thickening of the urinary bladder wall. Correlate with urinalysis results.    The appendix is normal. There is colonic diverticulosis without acute diverticulitis. There is some prominence of the pelvic fat. There is thickening of the distal ileum in the right lower quadrant compatible with ileitis. These findings may be the  result of inflammatory bowel disease such as Crohn's. Findings result in a moderate distal small bowel obstruction. Small bowel loops are dilated to nearly 5 cm. No free air is identified. There is no adenopathy. There is a trace amount of free fluid in  the small bowel mesentery.      Impression:      1. Distal ileitis resulting in a small bowel obstruction. Inflammatory bowel disease such as Crohn's is not excluded.  2. Colonic diverticulosis without acute diverticulitis. The appendix is normal.  3. Mild thickening of the urinary bladder wall may indicate cystitis. Correlate with urinalysis.  4. Trace amount free fluid in the small bowel mesentery. No free air.  5. Mild hepatic steatosis.  6. Tiny nonobstructing right kidney stone.          Signer Name: Martin Flores MD   Signed: 4/18/2021 11:57 PM   Workstation Name: [facility]  [facility] [place]          I have discussed my impression and recommendations with the patient and family.    Thank you very much for asking us to be involved in this patient's care.  We will follow along with you.      Electronically signed by CRISPIN Le, 04/19/21, 9:06 AM EDT.  Electronically signed by Gerardo Brooks MD, 04/19/21, 11:13 AM EDT.  Please note that portions of this note were completed with a voice recognition program.

## 2021-04-19 NOTE — PHARMACY PATIENT ASSISTANCE
Pharmacy evaluated the cost of NOACs for patient. Eliquis is not on formulary but Xarelto is covered with a $0 copay.    Thank you,    Debra Marin, PharmD  04/19/21  13:40 EDT

## 2021-04-19 NOTE — ED PROVIDER NOTES
Subjective   50-year-old male presents the emergency room complaints of abdominal pain.  Patient reports he started have abdominal cramping 3 days ago.  He states that initial he was having nausea and cramping.  He states that cramping is progressively gotten worse and looking the periumbilical region radiates to his right lower quadrant.  He states that decreased appetite.  He does report a couple episodes of watery diarrhea.  He does report he has had sick contacts through multiple family versus had a GI bug over the past couple weeks.  He denies fever chills.  He denies chest pain shortness of breath.  He states that symptoms continue to recur he spoke to his family about symptoms and decided to come to the emergency room for evaluation.      Abdominal Pain  Pain location:  RLQ and periumbilical  Pain quality: aching and cramping    Timing:  Intermittent  Progression:  Waxing and waning  Relieved by:  None tried  Ineffective treatments:  None tried  Associated symptoms: diarrhea and nausea    Associated symptoms: no belching, no chest pain, no chills, no constipation, no cough, no dysuria, no fatigue, no fever, no shortness of breath and no vomiting        Review of Systems   Constitutional: Negative for chills, fatigue and fever.   Respiratory: Negative for cough and shortness of breath.    Cardiovascular: Negative for chest pain.   Gastrointestinal: Positive for abdominal pain, diarrhea and nausea. Negative for constipation and vomiting.   Genitourinary: Negative for dysuria.   All other systems reviewed and are negative.      Past Medical History:   Diagnosis Date   • Sleep apnea        Allergies   Allergen Reactions   • Penicillins Hives       No past surgical history on file.    No family history on file.    Social History     Socioeconomic History   • Marital status:      Spouse name: Not on file   • Number of children: Not on file   • Years of education: Not on file   • Highest education level: Not  on file   Tobacco Use   • Smoking status: Never Smoker   • Smokeless tobacco: Never Used   Substance and Sexual Activity   • Alcohol use: Never   • Drug use: Never   • Sexual activity: Defer           Objective   Physical Exam  Vitals and nursing note reviewed.   Constitutional:       Appearance: He is obese. He is not ill-appearing or diaphoretic.   HENT:      Head: Normocephalic and atraumatic.      Comments: Moist mucous membranes.  Clear oropharynx.  Uvula midline.  Tolerating oral secretions.  Eyes:      Pupils: Pupils are equal, round, and reactive to light.   Cardiovascular:      Rate and Rhythm: Normal rate and regular rhythm.      Comments: No murmur rubs or gallops.  2+ radial pulses 2+ dorsalis pedis pulses bilaterally.  Pulmonary:      Effort: Pulmonary effort is normal.      Breath sounds: Normal breath sounds.      Comments: Unlabored breathing  Abdominal:      General: Bowel sounds are normal.      Palpations: Abdomen is soft.      Tenderness: There is no abdominal tenderness. There is no guarding or rebound. Negative signs include McBurney's sign.   Skin:     General: Skin is warm and dry.      Capillary Refill: Capillary refill takes less than 2 seconds.   Neurological:      General: No focal deficit present.      Mental Status: He is alert and oriented to person, place, and time.      Cranial Nerves: No cranial nerve deficit.   Psychiatric:         Mood and Affect: Mood normal.         Procedures           ED Course  ED Course as of Apr 26 0636   Sun Apr 18, 2021   2234 Patient CMP unremarkable lipase coags CBC unremarkable.    [BB]   Mon Apr 19, 2021   0011 Patient CT scan shows findings of ileitis had findings concerning for small bowel obstruction.  Have discussed case with Dr. Cat with general surgery states patient be kept n.p.o. started on IV fluids patient given antibiotics as well.  Patient admitted to hospital for further evaluation treatment.    [BB]      ED Course User Index  [BB]  Casimiro Masterson MD                                           [facility]    Final diagnoses:   Small bowel obstruction (CMS/HCC)       ED Disposition  ED Disposition     ED Disposition Condition Comment    Decision to Admit  Level of Care: Med/Surg [1]   Diagnosis: Small bowel obstruction (CMS/HCC) [677968]   Admitting Physician: IVÁN BECKER [[id number]]   Attending Physician: IVÁN BECKER [[id number]]   Certification: I Certify That Inpatient Hospital Services Are Medically Necessary For Greater Than 2 Midnights            Sintia Ly MD  [address] [place] [id number] [phone]          Iván Becker MD  [address] [place] [id number]  [phone]      With cardiology as scheduled         Medication List      New Prescriptions    Xarelto 20 MG tablet  Generic drug: rivaroxaban  Take 1 tablet by mouth Daily With Dinner. Indications: Atrial Fibrillation           Where to Get Your Medications      These medications were sent to [facility]  [address] [facility] [address]    Hours: 8AM-6PM Mon-Fri Phone: [phone]   · Xarelto 20 MG tablet          Casimiro Masterson MD  04/26/21 8421

## 2021-04-19 NOTE — H&P
[facility]   HISTORY AND PHYSICAL    Patient Name: Lloyd Loza  : 1970  MRN: [id number]  Primary Care Physician: Sintia Ly MD  Date of admission: 2021    Subjective   Subjective     Chief Complaint: Abdominal pain    50-year-old male presenting with 2 to 3-day history of abdominal pain that is crampy in nature with severe diarrhea.  Diarrhea has lessened but his crampy abdominal pain persisted and localized to the right lower quadrant so he came to the emergency department to be evaluated for possible appendicitis.  He states he did have sick contacts with recent GI illness but denies any contact with livestock or unpasteurized milk or contaminated water sources.  On CT the abdomen pelvis the patient has some distal ileal thickening but no significant free fluid or inflammatory change.  No nausea or vomiting reported currently.  He states last night his cramping did ease up quite a bit.  Unfortunately he did develop a run of atrial fibrillation and reports previously known history of arrhythmia but no specific diagnosis.  No chest pain or shortness of breath reported.  Heart rate is in the 70s.      Review of Systems   Constitutional: Negative for activity change, appetite change, chills and fever.   HENT: Negative for sore throat and trouble swallowing.    Eyes: Negative for visual disturbance.   Respiratory: Negative for cough and shortness of breath.    Cardiovascular: Negative for chest pain and palpitations.   Gastrointestinal: Positive for abdominal pain, diarrhea and nausea. Negative for abdominal distention, blood in stool, constipation and vomiting.   Endocrine: Negative for cold intolerance and heat intolerance.   Genitourinary: Negative for dysuria.   Musculoskeletal: Negative for joint swelling.   Skin: Negative for color change, rash and wound.   Allergic/Immunologic: Negative for immunocompromised state.   Neurological: Negative for dizziness, seizures, weakness and  headaches.   Hematological: Negative for adenopathy. Does not bruise/bleed easily.   Psychiatric/Behavioral: Negative for agitation and confusion.        Personal History     Past Medical History:   Diagnosis Date   • Sleep apnea        No past surgical history on file.    Family History: family history is not on file. Otherwise pertinent FHx was reviewed and not pertinent to current issue.    Social History:  reports that he has never smoked. He has never used smokeless tobacco. He reports that he does not drink alcohol and does not use drugs.    Home Medications:  Guselkumab      Allergies:  Allergies   Allergen Reactions   • Penicillins Hives       Objective    Objective     Vitals:  Temp:  [97.6 °F (36.4 °C)-98.4 °F (36.9 °C)] 98.4 °F (36.9 °C)  Heart Rate:  [67-88] 67  Resp:  [18] 18  BP: (118-165)/() 118/62    Physical Exam  Constitutional:       Appearance: He is well-developed.   HENT:      Head: Normocephalic and atraumatic.   Eyes:      Conjunctiva/sclera: Conjunctivae normal.      Pupils: Pupils are equal, round, and reactive to light.   Neck:      Thyroid: No thyromegaly.      Vascular: No JVD.      Trachea: No tracheal deviation.   Cardiovascular:      Rate and Rhythm: Normal rate and regular rhythm.      Heart sounds: No murmur heard.   No friction rub. No gallop.    Pulmonary:      Effort: Pulmonary effort is normal.      Breath sounds: Normal breath sounds.   Abdominal:      General: There is no distension.      Palpations: Abdomen is soft. There is no hepatomegaly or splenomegaly.      Tenderness: There is no abdominal tenderness.      Hernia: No hernia is present.   Musculoskeletal:         General: No deformity. Normal range of motion.      Cervical back: Neck supple.   Skin:     General: Skin is warm and dry.   Neurological:      Mental Status: He is alert and oriented to person, place, and time.         Result Review    Result Review:  I have personally reviewed the results from the time  of this admission to 04/19/21 7:20 AM EDT and agree with these findings:  [x]  Laboratory  []  Microbiology  [x]  Radiology  []  EKG/Telemetry   []  Cardiology/Vascular   []  Pathology  []  Old records  []  Other:  Most notable findings include: Improving abdominal pain, recent atrial fibrillation on EKG and telemetry    Assessment/Plan   Assessment / Plan     Brief Patient Summary:  Lloyd Loza is a 50 y.o. male who presents with abdominal pain likely representing gastroenteritis.  He has distal ileal thickening but no significant history of previous symptoms or family history of inflammatory bowel disease.  He does not clinically appear to be obstructed and states his pain is improving.  He unfortunately developed some atrial fibrillation without significant ventricular response and is hemodynamically stable.    Active Hospital Problems:  Active Hospital Problems    Diagnosis    • Small bowel obstruction (CMS/HCC)        Plan:   Cardiology consult to assess atrial fibrillation  Continue IV fluids and antibiotic  Stool cultures to evaluate for possible bacterial source of enteritis  Continue bowel rest for now    DVT prophylaxis: Subcutaneous heparin    CODE STATUS:    Code Status and Medical Interventions:   Ordered at: 04/19/21 0135     Level Of Support Discussed With:    Patient     Code Status:    CPR     Medical Interventions (Level of Support Prior to Arrest):    Full       Admission Status:  I believe this patient meets inpatient status.    Electronically signed by Iván Cat MD, 04/19/21, 7:20 AM EDT.

## 2021-04-19 NOTE — ED NOTES
Pt being transferred to IP floor at this time by ER tech. Pt alert and oriented X4. Respirations even and unlabored. IV intact and infusing. VSS. NAD noted.     Aditya Machado, RN  04/19/21 0156

## 2021-04-19 NOTE — ED NOTES
Pt given cup and advised to provide urine sample as soon as possible     Aditya Machado, RN  04/18/21 2966

## 2021-04-19 NOTE — PLAN OF CARE
Goal Outcome Evaluation:     Progress: no change  Patient resting in bed. VSS. No requests at this time. Will continue to monitor.

## 2021-04-20 LAB
ADV 40+41 DNA STL QL NAA+NON-PROBE: NOT DETECTED
ASTRO TYP 1-8 RNA STL QL NAA+NON-PROBE: NOT DETECTED
C CAYETANENSIS DNA STL QL NAA+NON-PROBE: NOT DETECTED
C COLI+JEJ+UPSA DNA STL QL NAA+NON-PROBE: NOT DETECTED
CHOLEST SERPL-MCNC: 65 MG/DL (ref 0–200)
CRYPTOSP DNA STL QL NAA+NON-PROBE: NOT DETECTED
E HISTOLYT DNA STL QL NAA+NON-PROBE: NOT DETECTED
EAEC PAA PLAS AGGR+AATA ST NAA+NON-PRB: NOT DETECTED
EC STX1+STX2 GENES STL QL NAA+NON-PROBE: NOT DETECTED
EPEC EAE GENE STL QL NAA+NON-PROBE: NOT DETECTED
ETEC LTA+ST1A+ST1B TOX ST NAA+NON-PROBE: NOT DETECTED
G LAMBLIA DNA STL QL NAA+NON-PROBE: NOT DETECTED
HDLC SERPL-MCNC: 21 MG/DL (ref 40–60)
LDLC SERPL CALC-MCNC: 18 MG/DL (ref 0–100)
LDLC/HDLC SERPL: 0.67 {RATIO}
NOROVIRUS GI+II RNA STL QL NAA+NON-PROBE: DETECTED
P SHIGELLOIDES DNA STL QL NAA+NON-PROBE: NOT DETECTED
RVA RNA STL QL NAA+NON-PROBE: NOT DETECTED
S ENT+BONG DNA STL QL NAA+NON-PROBE: NOT DETECTED
SAPO I+II+IV+V RNA STL QL NAA+NON-PROBE: NOT DETECTED
SHIGELLA SP+EIEC IPAH ST NAA+NON-PROBE: NOT DETECTED
TRIGL SERPL-MCNC: 150 MG/DL (ref 0–150)
TSH SERPL DL<=0.05 MIU/L-ACNC: 1.04 UIU/ML (ref 0.27–4.2)
V CHOL+PARA+VUL DNA STL QL NAA+NON-PROBE: NOT DETECTED
V CHOLERAE DNA STL QL NAA+NON-PROBE: NOT DETECTED
VLDLC SERPL-MCNC: 26 MG/DL (ref 5–40)
Y ENTEROCOL DNA STL QL NAA+NON-PROBE: NOT DETECTED

## 2021-04-20 PROCEDURE — 93005 ELECTROCARDIOGRAM TRACING: CPT | Performed by: INTERNAL MEDICINE

## 2021-04-20 PROCEDURE — 94799 UNLISTED PULMONARY SVC/PX: CPT

## 2021-04-20 PROCEDURE — 0097U HC BIOFIRE FILMARRAY GI PANEL: CPT | Performed by: SURGERY

## 2021-04-20 PROCEDURE — 80061 LIPID PANEL: CPT | Performed by: NURSE PRACTITIONER

## 2021-04-20 PROCEDURE — 99231 SBSQ HOSP IP/OBS SF/LOW 25: CPT | Performed by: SURGERY

## 2021-04-20 PROCEDURE — 84443 ASSAY THYROID STIM HORMONE: CPT | Performed by: NURSE PRACTITIONER

## 2021-04-20 PROCEDURE — 99232 SBSQ HOSP IP/OBS MODERATE 35: CPT | Performed by: INTERNAL MEDICINE

## 2021-04-20 PROCEDURE — 25010000002 HEPARIN (PORCINE) PER 1000 UNITS: Performed by: SURGERY

## 2021-04-20 PROCEDURE — 93010 ELECTROCARDIOGRAM REPORT: CPT | Performed by: INTERNAL MEDICINE

## 2021-04-20 RX ORDER — FLECAINIDE ACETATE 50 MG/1
50 TABLET ORAL EVERY 12 HOURS SCHEDULED
Status: DISCONTINUED | OUTPATIENT
Start: 2021-04-20 | End: 2021-04-21

## 2021-04-20 RX ADMIN — RIVAROXABAN 20 MG: 20 TABLET, FILM COATED ORAL at 17:10

## 2021-04-20 RX ADMIN — METOPROLOL TARTRATE 12.5 MG: 25 TABLET, FILM COATED ORAL at 13:49

## 2021-04-20 RX ADMIN — METRONIDAZOLE 500 MG: 500 INJECTION, SOLUTION INTRAVENOUS at 10:33

## 2021-04-20 RX ADMIN — METOPROLOL TARTRATE 12.5 MG: 25 TABLET, FILM COATED ORAL at 22:02

## 2021-04-20 RX ADMIN — METRONIDAZOLE 500 MG: 500 INJECTION, SOLUTION INTRAVENOUS at 01:58

## 2021-04-20 RX ADMIN — SODIUM CHLORIDE, POTASSIUM CHLORIDE, SODIUM LACTATE AND CALCIUM CHLORIDE 125 ML/HR: 600; 310; 30; 20 INJECTION, SOLUTION INTRAVENOUS at 18:26

## 2021-04-20 RX ADMIN — SODIUM CHLORIDE, PRESERVATIVE FREE 10 ML: 5 INJECTION INTRAVENOUS at 08:56

## 2021-04-20 RX ADMIN — FLECAINIDE ACETATE 50 MG: 50 TABLET ORAL at 13:49

## 2021-04-20 RX ADMIN — HEPARIN SODIUM 5000 UNITS: 5000 INJECTION INTRAVENOUS; SUBCUTANEOUS at 13:50

## 2021-04-20 RX ADMIN — SODIUM CHLORIDE, POTASSIUM CHLORIDE, SODIUM LACTATE AND CALCIUM CHLORIDE 125 ML/HR: 600; 310; 30; 20 INJECTION, SOLUTION INTRAVENOUS at 08:55

## 2021-04-20 RX ADMIN — HEPARIN SODIUM 5000 UNITS: 5000 INJECTION INTRAVENOUS; SUBCUTANEOUS at 05:19

## 2021-04-20 RX ADMIN — SODIUM CHLORIDE 2 G: 900 INJECTION INTRAVENOUS at 17:10

## 2021-04-20 RX ADMIN — SODIUM CHLORIDE 2 G: 900 INJECTION INTRAVENOUS at 00:38

## 2021-04-20 RX ADMIN — SODIUM CHLORIDE 2 G: 900 INJECTION INTRAVENOUS at 08:56

## 2021-04-20 RX ADMIN — METRONIDAZOLE 500 MG: 500 INJECTION, SOLUTION INTRAVENOUS at 18:25

## 2021-04-20 NOTE — PLAN OF CARE
Goal Outcome Evaluation:  Plan of Care Reviewed With: patient  Progress: no change  Outcome Summary: Pt has rested in bed throughout shift. No complaints of pain. Pt has maintained NPO status. No acute changes noted.

## 2021-04-20 NOTE — PLAN OF CARE
Goal Outcome Evaluation:        Outcome Summary: Patient has rested in bed throughout shift. VSS. No complaints. Will continue to monitor

## 2021-04-20 NOTE — PAYOR COMM NOTE
"CONTACT:  Maria A Heaton, RN    Utilization Management Dept.   [facility]  [address] [place] [address]    Phone:[phone]  Fax: [phone]    REQUEST FOR ADDITIONAL DAYS    REF# [id number]      Donavon Colby (50 y.o. Male)     Date of Birth Social Security Number Address Home Phone MRN    1970  [address] [address] [phone] [id number]    Yarsanism Marital Status          Worship        Admission Date Admission Type Admitting Provider Attending Provider Department, Room/Bed    4/18/21 Emergency Iván Cat MD House, Aaron Bradley, MD [facility] [address] [id number]/    Discharge Date Discharge Disposition Discharge Destination                       Attending Provider: Iván Cat MD    Allergies: Penicillins    Isolation: None   Infection: Norovirus (04/20/21)   Code Status: CPR    Ht: 193 cm (75.98\")   Wt: 129 kg (283 lb 14.4 oz)    Admission Cmt: None   Principal Problem: None                Active Insurance as of 4/18/2021     Primary Coverage     Payor Plan Insurance Group Employer/Plan Group    [facility] BLUE CROSS [employer] EMPLOYEE [id number]     Payor Plan Address Payor Plan Phone Number Payor Plan Fax Number Effective Dates    [address] [phone]  1/1/2015 - None Entered    [place] [address]       Subscriber Name Subscriber Birth Date Member ID       DONAVON COLBY 1970 [id number]                 Emergency Contacts      (Rel.) Home Phone Work Phone Mobile Phone    torin colby (Daughter) [phone] -- --              Lab Results (last 24 hours)     Procedure Component Value Units Date/Time    Gastrointestinal Panel, PCR - Stool, Per Rectum [[id number]]  (Abnormal) Collected: 04/20/21 0928    Specimen: Stool from Per Rectum Updated: 04/20/21 1049     Campylobacter Not Detected     Plesiomonas shigelloides Not Detected     Salmonella Not Detected     Vibrio Not Detected     " Vibrio cholerae Not Detected     Yersinia enterocolitica Not Detected     Enteroaggregative E. coli (EAEC) Not Detected     Enteropathogenic E. coli (EPEC) Not Detected     Enterotoxigenic E. coli (ETEC) lt/st Not Detected     Shiga-like toxin-producing E. coli (STEC) stx1/stx2 Not Detected     Shigella/Enteroinvasive E. coli (EIEC) Not Detected     Cryptosporidium Not Detected     Cyclospora cayetanensis Not Detected     Entamoeba histolytica Not Detected     Giardia lamblia Not Detected     Adenovirus F40/41 Not Detected     Astrovirus Not Detected     Norovirus GI/GII Detected     Rotavirus A Not Detected     Sapovirus (I, II, IV or V) Not Detected    Lipid Panel [[id number]]  (Abnormal) Collected: 04/20/21 0510    Specimen: Blood Updated: 04/20/21 0612     Total Cholesterol 65 mg/dL      Triglycerides 150 mg/dL      HDL Cholesterol 21 mg/dL      LDL Cholesterol  18 mg/dL      VLDL Cholesterol 26 mg/dL      LDL/HDL Ratio 0.67    Narrative:      Cholesterol Reference Ranges  (U.S. Department of Health and Human Services ATP III Classifications)    Desirable          <200 mg/dL  Borderline High    200-239 mg/dL  High Risk          >240 mg/dL      Triglyceride Reference Ranges  (U.S. Department of Health and Human Services ATP III Classifications)    Normal           <150 mg/dL  Borderline High  150-199 mg/dL  High             200-499 mg/dL  Very High        >500 mg/dL    HDL Reference Ranges  (U.S. Department of Health and Human Services ATP III Classifcations)    Low     <40 mg/dl (major risk factor for CHD)  High    >60 mg/dl ('negative' risk factor for CHD)        LDL Reference Ranges  (U.S. Department of Health and Human Services ATP III Classifcations)    Optimal          <100 mg/dL  Near Optimal     100-129 mg/dL  Borderline High  130-159 mg/dL  High             160-189 mg/dL  Very High        >189 mg/dL    TSH [[id number]]  (Normal) Collected: 04/20/21 0510    Specimen: Blood Updated: 04/20/21 0611     TSH  1.040 uIU/mL     Blood Culture - Blood, Arm, Right [[id number]] Collected: 04/19/21 0105    Specimen: Blood from Arm, Right Updated: 04/20/21 0115     Blood Culture No growth at 24 hours    Blood Culture - Blood, Hand, Right [[id number]] Collected: 04/19/21 0106    Specimen: Blood from Hand, Right Updated: 04/20/21 0115     Blood Culture No growth at 24 hours        Imaging Results (Last 24 Hours)     ** No results found for the last 24 hours. **        ECG/EMG Results (last 24 hours)     Procedure Component Value Units Date/Time    Adult Transthoracic Echo Complete W/ Cont if Necessary Per Protocol [[id number]] Collected: 04/19/21 1434     Updated: 04/19/21 1613     BSA 2.6 m^2      IVSd 1.6 cm      LVIDd 4.0 cm      LVIDs 4.3 cm      LVPWd 1.6 cm      IVS/LVPW 1.0     FS -7.5 %      EDV(Teich) 70.0 ml      ESV(Teich) 83.1 ml      EF(Teich) -18.7 %      EDV(cubed) 64.0 ml      ESV(cubed) 79.5 ml      EF(cubed) -24.2 %      LV mass(C)d 257.9 grams      LV mass(C)dI 99.4 grams/m^2      SV(Teich) -13.1 ml      SI(Teich) -5.0 ml/m^2      SV(cubed) -15.5 ml      SI(cubed) -6.0 ml/m^2      Ao root diam 3.6 cm      Ao root area 9.9 cm^2      ACS 2.9 cm      LA dimension 4.7 cm      LA/Ao 1.3     LVOT diam 2.4 cm      LVOT area 4.5 cm^2      LVOT area(traced) 4.5 cm^2      LVLd ap4 8.5 cm      EDV(MOD-sp4) 107.0 ml      LVLs ap4 8.2 cm      ESV(MOD-sp4) 84.9 ml      EF(MOD-sp4) 20.7 %      SV(MOD-sp4) 22.1 ml      SI(MOD-sp4) 8.5 ml/m^2      Ao root area (BSA corrected) 1.4     LV Joiner Vol (BSA corrected) 41.2 ml/m^2      LV Sys Vol (BSA corrected) 32.7 ml/m^2      MV E max radha 86.1 cm/sec      MV A max radha 52.7 cm/sec      MV E/A 1.6     PA acc time 0.06 sec      PA pr(Accel) 52.0 mmHg      BH CV ECHO BARBER - BZI_BMI 35.3 kilograms/m^2      BH CV ECHO BARBER - BSA(HAYCOCK) 2.7 m^2       CV ECHO BARBER - BZI_METRIC_WEIGHT 131.5 kg       CV ECHO BARBER - BZI_METRIC_HEIGHT 193.0 cm      Target HR (85%) 146 bpm      Max. Pred. HR  (100%) 172 bpm     Narrative:      · Normal left ventricular cavity size and wall thickness noted. All left   ventricular wall segments contract normally.  · Left ventricular ejection fraction appears to be 51 - 55%.  · The aortic valve is not well visualized. No aortic valve regurgitation   or stenosis is present. The aortic valve is grossly normal in structure.  · The mitral valve is structurally normal with no significant stenosis   present. Mild mitral valve regurgitation is present.  · There is no evidence of pericardial effusion.                Physician Progress Notes (last 24 hours) (Notes from 21 1140 through 21 1140)      Nicola Oconnell MD at 21 0932               LOS: 1 day     Name: Lloyd Loza  Age/Sex: 50 y.o. male  :  1970        PCP: Sintia Ly MD  REF: No ref. provider found    Active Problems:    Small bowel obstruction (CMS/HCC)      Reason for follow-up: Atrial fibrillation     Subjective       Subjective     Lloyd Loza is a 50 year old male with a past medical history significant for sleep apnea. He presented to the ER with complaints of abdominal pain and diarrhea x 2-3 days. He reports having sick contacts with GI illness. Cardiology was consulted for atrial fibrillation. EKG showed atrial fibrillation with a controlled rate. He denies a history of atrial fibrillation but states he has been told in the past he had an irregular heart rhythm and heart murmur. Denies any work up for this. He denies any chest pain, syncope, palpitations, dizziness or lower extremity edema. States he is a . He does have a history of sleep apnea and wears a C-pap at night. He does report he does not follow with his PCP regularly. He is not a smoker, diabetic or have a history of hypertension. Denies any history of coronary artery disease.     Interval History: Patient reports he is feeling good today. Denies any palpitations, chest pain,  or shortness of breath.  Remains in atrial flutter with a controlled rate. Echocardiogram showed EF of 51-55% with normal right and left atrial size.       Vital Signs  Temp:  [97.9 °F (36.6 °C)-99 °F (37.2 °C)] 98.1 °F (36.7 °C)  Heart Rate:  [71-81] 79  Resp:  [18] 18  BP: (103-121)/(57-76) 111/63     Vital Signs (last 72 hrs)       04/17 0700  -  04/18 0659 04/18 0700  -  04/19 0659 04/19 0700  -  04/20 0659 04/20 0700  -  04/20 0932   Most Recent    Temp (°F)   97.6 -  98.4    97.9 -  99       98.1 (36.7)    Heart Rate   67 -  88    71 -  81       79    Resp     18      18       18    BP   118/62 -  165/99    103/57 -  121/76       111/63    SpO2 (%)   94 -  99      94      93     93        Body mass index is 34.57 kg/m².    Intake/Output Summary (Last 24 hours) at 4/20/2021 0932  Last data filed at 4/20/2021 0800  Gross per 24 hour   Intake 2255 ml   Output --   Net 2255 ml     Objective    Objective     I have seen and examined Mr. Loza today.  Physical Exam:     General Appearance:    Alert, cooperative, in no acute distress   Head:    Normocephalic, without obvious abnormality, atraumatic   Eyes:            Conjunctivae and sclerae normal, no   icterus, no pallor, corneas clear.   Neck:   No adenopathy, supple, trachea midline, no thyromegaly, no   carotid bruit, no JVD   Lungs:     Clear to auscultation,respirations regular, even and                  unlabored    Heart:    irregular rhythm and normal rate, normal S1 and S2, no            murmur, no gallop, no rub, no click   Chest Wall:    No abnormalities observed   Abdomen:     Normal bowel sounds, no masses, no organomegaly, soft        non-tender, non-distended, no guarding, no rebound                tenderness   Extremities:   Moves all extremities well, no edema, no cyanosis, no             redness   Pulses:   Pulses palpable and equal bilaterally   Skin:   No bleeding, bruising or rash       Neurologic:   Alert and oriented      Results review       Results Review:    Results from last 7 days   Lab Units 04/19/21  0719 04/18/21  2142   WBC 10*3/mm3 8.57 10.02   HEMOGLOBIN g/dL 15.9 17.2   PLATELETS 10*3/mm3 379 265     Results from last 7 days   Lab Units 04/19/21  0719 04/18/21  2142   SODIUM mmol/L 140 141   POTASSIUM mmol/L 3.6 3.6   CHLORIDE mmol/L 107 102   CO2 mmol/L 24.3 30.2*   BUN mg/dL 10 11   CREATININE mg/dL 1.02 1.15   CALCIUM mg/dL 8.4* 9.1   GLUCOSE mg/dL 103* 106*   ALT (SGPT) U/L  --  26   AST (SGOT) U/L  --  22         Lab Results   Component Value Date    INR 0.97 04/18/2021     No results found for: MG  Lab Results   Component Value Date    TSH 1.040 04/20/2021    TRIG 150 04/20/2021    HDL 21 (L) 04/20/2021    LDL 18 04/20/2021      Imaging Results (Last 48 Hours)     Procedure Component Value Units Date/Time    CT Abdomen Pelvis With Contrast [[id number]] Collected: 04/18/21 2357     Updated: 04/18/21 2359    Narrative:      CT Abdomen Pelvis W    INDICATION:   Right lower quadrant pain for the last several days.    TECHNIQUE:   CT of the abdomen and pelvis with IV contrast. Coronal and sagittal reconstructions were obtained.  Radiation dose reduction techniques included automated exposure control or exposure modulation based on body size. Count of known CT and cardiac nuc med  studies performed in previous 12 months: 0.     COMPARISON:   None available.    FINDINGS:  There is mild atelectasis in the lower lobes. Abdominal aorta is normal in caliber. Gallbladder is normal. No biliary obstruction is seen. There is a tiny nonobstructing right-sided kidney stone. No ureteral stones are seen on either side, and there is  no hydronephrosis. There is mild hepatic steatosis. Solid organs are otherwise within normal limits. There is some mild thickening of the urinary bladder wall. Correlate with urinalysis results.    The appendix is normal. There is colonic diverticulosis without acute diverticulitis. There is some prominence of the pelvic fat. There is  thickening of the distal ileum in the right lower quadrant compatible with ileitis. These findings may be the  result of inflammatory bowel disease such as Crohn's. Findings result in a moderate distal small bowel obstruction. Small bowel loops are dilated to nearly 5 cm. No free air is identified. There is no adenopathy. There is a trace amount of free fluid in  the small bowel mesentery.      Impression:      1. Distal ileitis resulting in a small bowel obstruction. Inflammatory bowel disease such as Crohn's is not excluded.  2. Colonic diverticulosis without acute diverticulitis. The appendix is normal.  3. Mild thickening of the urinary bladder wall may indicate cystitis. Correlate with urinalysis.  4. Trace amount free fluid in the small bowel mesentery. No free air.  5. Mild hepatic steatosis.  6. Tiny nonobstructing right kidney stone.          Signer Name: Martin Flores MD   Signed: 4/18/2021 11:57 PM   Workstation Name: [facility]    [facility]        Echo   Results for orders placed during the hospital encounter of 04/18/21    Adult Transthoracic Echo Complete W/ Cont if Necessary Per Protocol    Interpretation Summary  · Normal left ventricular cavity size and wall thickness noted. All left ventricular wall segments contract normally.  · Left ventricular ejection fraction appears to be 51 - 55%.  · The aortic valve is not well visualized. No aortic valve regurgitation or stenosis is present. The aortic valve is grossly normal in structure.  · The mitral valve is structurally normal with no significant stenosis present. Mild mitral valve regurgitation is present.  · There is no evidence of pericardial effusion.     I reviewed the patient's new clinical results.    Telemetry: Atrial flutter 70-80 bpm        Medication Review:   aztreonam, 2 g, Intravenous, Q8H  heparin (porcine), 5,000 Units, Subcutaneous, Q8H  metroNIDAZOLE, 500 mg, Intravenous, Q8H        lactated ringers, 125  mL/hr, Last Rate: 125 mL/hr (04/20/21 0855)        Assessment    1. New onset atrial fibrillation with controlled ventricular rate.  2. No known coronary artery disease or structural heart disease.    Plan   1. She has no history of known coronary artery disease or significant structural heart disease will go ahead and initiate will initiate flecainide 50 mg p.o. twice daily.  2. I have discussed with her over the option of electrocardioversion and he is still thinking about it.  We will plan for this tomorrow if patient is agreeable.    I discussed the patients findings and my recommendations with patient.      Electronically signed by CRISPIN Le, 04/20/21, 9:32 AM EDT.    Electronically signed by Nicola Oconnell MD, 04/20/21, 11:02 AM EDT.      Please note that portions of this note were completed with a voice recognition program.    Electronically signed by Nicola Oconnell MD at 04/20/21 1102     Scheduled Meds Sorted by Name  for LozaLloyd as of 4/18/21 through 4/20/21    1 Day 3 Days 7 Days 10 Days < Today >    Legend:                          Inactive     Active     Other Encounter     Linked                 Medications 04/18/21 04/19/21 04/20/21   aztreonam (AZACTAM) 2 g/100 mL 0.9% NS (mbp)   Dose: 2 g  Freq: Every 8 Hours Route: IV  Indications of Use: INTRA-ABDOMINAL INFECTION  Start: 04/19/21 0900 End: 04/22/21 0859    Admin Instructions:   Break seal and mix to activate vial before use     0829   1733        0038   0856   1700        aztreonam (AZACTAM) 2 g/100 mL 0.9% NS (mbp)   Dose: 2 g  Freq: Once Route: IV  Indications of Use: SEPSIS  Start: 04/19/21 0013 End: 04/19/21 0142    Admin Instructions:   Break seal and mix to activate vial before use     0112             flecainide (TAMBOCOR) tablet 50 mg   Dose: 50 mg  Freq: Every 12 Hours Scheduled Route: PO  Start: 04/20/21 1300      1300   2100          heparin (porcine) 5000 UNIT/ML injection 5,000 Units   Dose: 5,000 Units  Freq:  Every 8 Hours Scheduled Route: SC  Indications Comment: Prophylaxis of Venous Thromboembolism  Start: 04/19/21 0800     0845   1402   2025             0519   1400   2200        iopamidol (ISOVUE-300) 61 % injection 100 mL   Dose: 100 mL  Freq: Once in Imaging Route: IV  Start: 04/18/21 2328 End: 04/18/21 2327 2327              metoprolol tartrate (LOPRESSOR) tablet 12.5 mg   Dose: 12.5 mg  Freq: Every 12 Hours Scheduled Route: PO  Start: 04/20/21 1300      1300   2100          metroNIDAZOLE (FLAGYL) 500 mg/100mL IVPB   Dose: 500 mg  Freq: Every 8 Hours Route: IV  Indications of Use: INTRA-ABDOMINAL INFECTION  Start: 04/19/21 1100 End: 04/22/21 1059    Admin Instructions:   Caution: Look alike/sound alike drug alert. Do not refrigerate.     1008   1808        0158    1033     1900            metroNIDAZOLE (FLAGYL) 500 mg/100mL IVPB   Dose: 500 mg  Freq: Once Route: IV  Indications of Use: SEPSIS  Start: 04/19/21 0013 End: 04/19/21 0436    Admin Instructions:   Caution: Look alike/sound alike drug alert. Do not refrigerate.     0336 [C]             ondansetron (ZOFRAN) injection 4 mg   Dose: 4 mg  Freq: Once Route: IV  Start: 04/18/21 2124 End: 04/18/21 2157    2157              sodium chloride 0.9 % bolus 500 mL   Dose: 500 mL  Freq: Once Route: IV  Last Dose: Stopped (04/18/21 2215)  Start: 04/18/21 2124 End: 04/18/21 2215    2143   2215            vancomycin 2000 mg/500 mL 0.9% NS IVPB ([facility])   Dose: 2,000 mg  Freq: Once Route: IV  Indications of Use: SEPSIS  Start: 04/19/21 0013 End: 04/19/21 0535     0335 [C]             Medications 04/18/21 04/19/21 04/20/21       Continuous Meds Sorted by Name  for Lloyd Loza as of 4/18/21 through 4/20/21   Legend:                          Inactive     Active     Other Encounter     Linked                 Medications 04/18/21 04/19/21 04/20/21   lactated ringers infusion   Rate: 125 mL/hr Dose: 125 mL/hr  Freq: Continuous Route: IV  Last Dose: 125 mL/hr (04/20/21  0855)  Start: 04/19/21 0015     0112          0855                PRN Meds Sorted by Name  for Lloyd Loza as of 4/18/21 through 4/20/21   Legend:                          Inactive     Active     Other Encounter     Linked                 Medications 04/18/21 04/19/21 04/20/21   morphine injection 2 mg   Dose: 2 mg  Freq: Every 4 Hours PRN Route: IV  PRN Reason: Severe Pain   Start: 04/19/21 0013    Admin Instructions:   If given for pain, use the following pain scale:  Mild Pain = Pain Score of 1-3, CPOT 1-2  Moderate Pain = Pain Score of 4-6, CPOT 3-4  Severe Pain = Pain Score of 7-10, CPOT 5-8         ondansetron (ZOFRAN) injection 4 mg   Dose: 4 mg  Freq: Every 8 Hours PRN Route: IV  PRN Reasons: Nausea,Vomiting  Start: 04/19/21 0013         sodium chloride 0.9 % flush 10 mL   Dose: 10 mL  Freq: As Needed Route: IV  PRN Reason: Line Care  Start: 04/18/21 2121      0856              Consult Notes (last 24 hours) (Notes from 04/19/21 1140 through 04/20/21 1140)    No notes of this type exist for this encounter.

## 2021-04-20 NOTE — PROGRESS NOTES
LOS: 1 day     Name: Lloyd Loza  Age/Sex: 50 y.o. male  :  1970        PCP: Sintia Ly MD  REF: No ref. provider found    Active Problems:    Small bowel obstruction (CMS/[facility])      Reason for follow-up: Atrial fibrillation     Subjective       Subjective     Lloyd Loza is a 50 year old male with a past medical history significant for sleep apnea. He presented to the ER with complaints of abdominal pain and diarrhea x 2-3 days. He reports having sick contacts with GI illness. Cardiology was consulted for atrial fibrillation. EKG showed atrial fibrillation with a controlled rate. He denies a history of atrial fibrillation but states he has been told in the past he had an irregular heart rhythm and heart murmur. Denies any work up for this. He denies any chest pain, syncope, palpitations, dizziness or lower extremity edema. States he is a . He does have a history of sleep apnea and wears a C-pap at night. He does report he does not follow with his PCP regularly. He is not a smoker, diabetic or have a history of hypertension. Denies any history of coronary artery disease.     Interval History: Patient reports he is feeling good today. Denies any palpitations, chest pain,  or shortness of breath. Remains in atrial flutter with a controlled rate. Echocardiogram showed EF of 51-55% with normal right and left atrial size.       Vital Signs  Temp:  [97.9 °F (36.6 °C)-99 °F (37.2 °C)] 98.1 °F (36.7 °C)  Heart Rate:  [71-81] 79  Resp:  [18] 18  BP: (103-121)/(57-76) 111/63     Vital Signs (last 72 hrs)        0700  -  /18 0659  07  -   0659  07  -   0659  07  -   0932   Most Recent    Temp (°F)   97.6 -  98.4    97.9 -  99       98.1 (36.7)    Heart Rate   67 -  88    71 -  81       79    Resp     18      18       18    BP   118/62 -  165/99    103/57 -  121/76       111/63    SpO2 (%)   94 -  99      94      93     93        Body mass index is  34.57 kg/m².    Intake/Output Summary (Last 24 hours) at 4/20/2021 0932  Last data filed at 4/20/2021 0800  Gross per 24 hour   Intake 2255 ml   Output --   Net 2255 ml     Objective    Objective     I have seen and examined Mr. Loza today.  Physical Exam:     General Appearance:    Alert, cooperative, in no acute distress   Head:    Normocephalic, without obvious abnormality, atraumatic   Eyes:            Conjunctivae and sclerae normal, no   icterus, no pallor, corneas clear.   Neck:   No adenopathy, supple, trachea midline, no thyromegaly, no   carotid bruit, no JVD   Lungs:     Clear to auscultation,respirations regular, even and                  unlabored    Heart:    irregular rhythm and normal rate, normal S1 and S2, no            murmur, no gallop, no rub, no click   Chest Wall:    No abnormalities observed   Abdomen:     Normal bowel sounds, no masses, no organomegaly, soft        non-tender, non-distended, no guarding, no rebound                tenderness   Extremities:   Moves all extremities well, no edema, no cyanosis, no             redness   Pulses:   Pulses palpable and equal bilaterally   Skin:   No bleeding, bruising or rash       Neurologic:   Alert and oriented      Results review       Results Review:   Results from last 7 days   Lab Units 04/19/21  0719 04/18/21  2142   WBC 10*3/mm3 8.57 10.02   HEMOGLOBIN g/dL 15.9 17.2   PLATELETS 10*3/mm3 379 265     Results from last 7 days   Lab Units 04/19/21  0719 04/18/21  2142   SODIUM mmol/L 140 141   POTASSIUM mmol/L 3.6 3.6   CHLORIDE mmol/L 107 102   CO2 mmol/L 24.3 30.2*   BUN mg/dL 10 11   CREATININE mg/dL 1.02 1.15   CALCIUM mg/dL 8.4* 9.1   GLUCOSE mg/dL 103* 106*   ALT (SGPT) U/L  --  26   AST (SGOT) U/L  --  22         Lab Results   Component Value Date    INR 0.97 04/18/2021     No results found for: MG  Lab Results   Component Value Date    TSH 1.040 04/20/2021    TRIG 150 04/20/2021    HDL 21 (L) 04/20/2021    LDL 18 04/20/2021       Imaging Results (Last 48 Hours)     Procedure Component Value Units Date/Time    CT Abdomen Pelvis With Contrast [[id number]] Collected: 04/18/21 2357     Updated: 04/18/21 2359    Narrative:      CT Abdomen Pelvis W    INDICATION:   Right lower quadrant pain for the last several days.    TECHNIQUE:   CT of the abdomen and pelvis with IV contrast. Coronal and sagittal reconstructions were obtained.  Radiation dose reduction techniques included automated exposure control or exposure modulation based on body size. Count of known CT and cardiac nuc med  studies performed in previous 12 months: 0.     COMPARISON:   None available.    FINDINGS:  There is mild atelectasis in the lower lobes. Abdominal aorta is normal in caliber. Gallbladder is normal. No biliary obstruction is seen. There is a tiny nonobstructing right-sided kidney stone. No ureteral stones are seen on either side, and there is  no hydronephrosis. There is mild hepatic steatosis. Solid organs are otherwise within normal limits. There is some mild thickening of the urinary bladder wall. Correlate with urinalysis results.    The appendix is normal. There is colonic diverticulosis without acute diverticulitis. There is some prominence of the pelvic fat. There is thickening of the distal ileum in the right lower quadrant compatible with ileitis. These findings may be the  result of inflammatory bowel disease such as Crohn's. Findings result in a moderate distal small bowel obstruction. Small bowel loops are dilated to nearly 5 cm. No free air is identified. There is no adenopathy. There is a trace amount of free fluid in  the small bowel mesentery.      Impression:      1. Distal ileitis resulting in a small bowel obstruction. Inflammatory bowel disease such as Crohn's is not excluded.  2. Colonic diverticulosis without acute diverticulitis. The appendix is normal.  3. Mild thickening of the urinary bladder wall may indicate cystitis. Correlate with  urinalysis.  4. Trace amount free fluid in the small bowel mesentery. No free air.  5. Mild hepatic steatosis.  6. Tiny nonobstructing right kidney stone.          Signer Name: Martin Flores MD   Signed: 4/18/2021 11:57 PM   Workstation Name: [facility]    [facility]        Echo   Results for orders placed during the hospital encounter of 04/18/21    Adult Transthoracic Echo Complete W/ Cont if Necessary Per Protocol    Interpretation Summary  · Normal left ventricular cavity size and wall thickness noted. All left ventricular wall segments contract normally.  · Left ventricular ejection fraction appears to be 51 - 55%.  · The aortic valve is not well visualized. No aortic valve regurgitation or stenosis is present. The aortic valve is grossly normal in structure.  · The mitral valve is structurally normal with no significant stenosis present. Mild mitral valve regurgitation is present.  · There is no evidence of pericardial effusion.     I reviewed the patient's new clinical results.    Telemetry: Atrial flutter 70-80 bpm        Medication Review:   aztreonam, 2 g, Intravenous, Q8H  heparin (porcine), 5,000 Units, Subcutaneous, Q8H  metroNIDAZOLE, 500 mg, Intravenous, Q8H        lactated ringers, 125 mL/hr, Last Rate: 125 mL/hr (04/20/21 0855)        Assessment    1. New onset atrial fibrillation with controlled ventricular rate.  2. No known coronary artery disease or structural heart disease.    Plan   1. She has no history of known coronary artery disease or significant structural heart disease will go ahead and initiate will initiate flecainide 50 mg p.o. twice daily.  2. I have discussed with her over the option of electrocardioversion and he is still thinking about it.  We will plan for this tomorrow if patient is agreeable.    I discussed the patients findings and my recommendations with patient.      Electronically signed by CRISPIN Le, 04/20/21, 9:32 AM  EDT.    Electronically signed by Nicola Oconnell MD, 04/20/21, 11:02 AM EDT.      Please note that portions of this note were completed with a voice recognition program.

## 2021-04-21 VITALS
HEART RATE: 68 BPM | RESPIRATION RATE: 18 BRPM | SYSTOLIC BLOOD PRESSURE: 109 MMHG | BODY MASS INDEX: 34.57 KG/M2 | OXYGEN SATURATION: 95 % | WEIGHT: 283.9 LBS | TEMPERATURE: 98.4 F | DIASTOLIC BLOOD PRESSURE: 71 MMHG | HEIGHT: 76 IN

## 2021-04-21 LAB
QT INTERVAL: 382 MS
QTC INTERVAL: 382 MS

## 2021-04-21 PROCEDURE — 99232 SBSQ HOSP IP/OBS MODERATE 35: CPT | Performed by: SPECIALIST

## 2021-04-21 PROCEDURE — 94799 UNLISTED PULMONARY SVC/PX: CPT

## 2021-04-21 PROCEDURE — 99238 HOSP IP/OBS DSCHRG MGMT 30/<: CPT | Performed by: SURGERY

## 2021-04-21 PROCEDURE — 94660 CPAP INITIATION&MGMT: CPT

## 2021-04-21 PROCEDURE — 93005 ELECTROCARDIOGRAM TRACING: CPT | Performed by: NURSE PRACTITIONER

## 2021-04-21 RX ADMIN — SODIUM CHLORIDE 2 G: 900 INJECTION INTRAVENOUS at 08:40

## 2021-04-21 RX ADMIN — FLECAINIDE ACETATE 50 MG: 50 TABLET ORAL at 08:40

## 2021-04-21 RX ADMIN — METOPROLOL TARTRATE 12.5 MG: 25 TABLET, FILM COATED ORAL at 08:40

## 2021-04-21 RX ADMIN — METRONIDAZOLE 500 MG: 500 INJECTION, SOLUTION INTRAVENOUS at 03:00

## 2021-04-21 RX ADMIN — SODIUM CHLORIDE, PRESERVATIVE FREE 10 ML: 5 INJECTION INTRAVENOUS at 08:40

## 2021-04-21 RX ADMIN — SODIUM CHLORIDE 2 G: 900 INJECTION INTRAVENOUS at 01:00

## 2021-04-21 NOTE — DISCHARGE INSTR - APPOINTMENTS
You have a scheduled follow-up appointment with Dr. Ly on 4/29/21 at 10:45. Office number [phone]

## 2021-04-21 NOTE — PLAN OF CARE
Goal Outcome Evaluation:     Progress: no change  Outcome Summary: Pt resting in bed during assessment. No complaints at this time. Teley called to report that pt was having frequent 2 second pauses with heart rate dropping in the 40s but returning to normal after pauses. EKG was ordered and Cardology was notified with on call  instructing me to hold fleccinide 50mg and give pt lopressor 12.5 mg. Pt reported felling no symptoms during the pauses. Vital signs are currently stable, no acute changes at this time. Will continue to monitor.

## 2021-04-21 NOTE — DISCHARGE SUMMARY
Date of Discharge: 4/21/2021    Discharge Diagnosis: Gastroenteritis, atrial fibrillation    Presenting Problem/History of Present Illness  Active Hospital Problems    Diagnosis  POA   • Small bowel obstruction (CMS/HCC) [K56.609]  Yes      Resolved Hospital Problems   No resolved problems to display.          Hospital Course  Patient is a 50 y.o. male presented with gastroenteritis.  The patient was noted on admission to have atrial fibrillation on telemetry and subsequently consult with cardiology was performed.  The patient's gastroenteritis quickly resolved and he was tolerating regular diet.  After evaluation by cardiology plans were made for outpatient cardioversion.  He was initiated on anticoagulation and attempt at rate control therapy but heart rate became very low and he was in no rapid ventricular response at the time of atrial fibrillation and therefore this medication was discontinued he was ready for discharge home on anticoagulation with plans for outpatient cardioversion.    Procedures Performed         Consults:   Consults     Date and Time Order Name Status Description    4/19/2021  6:34 AM Inpatient Cardiology Consult Completed     4/19/2021 12:13 AM Surgery (on-call MD unless specified)            Vital Signs  Temp:  [98.4 °F (36.9 °C)] 98.4 °F (36.9 °C)  Heart Rate:  [63-72] 68  Resp:  [16-18] 18  BP: (108-115)/(71-73) 109/71  Discharge Disposition  Home or Self Care    Discharge Medications     Discharge Medications      New Medications      Instructions Start Date   Xarelto 20 MG tablet  Generic drug: rivaroxaban   20 mg, Oral, Daily With Dinner         Continue These Medications      Instructions Start Date   Tremfya 100 MG/ML solution pen-injector  Generic drug: Guselkumab   100 mg, Subcutaneous, Every 30 Days, Prior to [facility] Admission, Patient was on: Pt takes on the 21st of each month              Discharge Diet:   Diet Instructions     Advance Diet as Tolerated            Activity at  Discharge:     Follow-up Appointments  Future Appointments   Date Time Provider Department Center   4/29/2021  1:00 PM Meño White PA-C [facility] None   5/5/2021  2:50 PM [facility]     Additional Instructions for the Follow-ups that You Need to Schedule     Discharge Follow-up with Specialty:   As directed      Follow Up Details: With cardiology as scheduled               Test Results Pending at Discharge  Pending Labs     Order Current Status    Blood Culture - Blood, Arm, Right Preliminary result    Blood Culture - Blood, Hand, Right Preliminary result           Iván Cat MD  04/21/21  14:11 EDT    Time: Discharge 15 min

## 2021-04-21 NOTE — PROGRESS NOTES
Patient Identification:  Name:  Lloyd Loza  Age:  50 y.o.  Sex:  male  :  1970  MRN:  [id number]  Visit Number:  [id number]    Chief Complaint:   Atrial fibrillation    Subjective:    Lloyd Loza is a 50 year old male with a past medical history significant for sleep apnea. He presented to the ER with complaints of abdominal pain and diarrhea x 2-3 days. He reports having sick contacts with GI illness. Cardiology was consulted for atrial fibrillation. EKG showed atrial fibrillation with a controlled rate. He denies a history of atrial fibrillation but states he has been told in the past he had an irregular heart rhythm and heart murmur. Denies any work up for this. He denies any chest pain, syncope, palpitations, dizziness or lower extremity edema. States he is a . He does have a history of sleep apnea and wears a C-pap at night. He does report he does not follow with his PCP regularly. He is not a smoker, diabetic or have a history of hypertension. Denies any history of coronary artery disease.   ----------------------------------------------------------------------------------------------------------------------  Current Hospital Meds:  aztreonam, 2 g, Intravenous, Q8H  flecainide, 50 mg, Oral, Q12H  metoprolol tartrate, 12.5 mg, Oral, Q12H  metroNIDAZOLE, 500 mg, Intravenous, Q8H  rivaroxaban, 20 mg, Oral, Daily With Dinner      lactated ringers, 125 mL/hr, Last Rate: 125 mL/hr (21)      ----------------------------------------------------------------------------------------------------------------------  Vital Signs:  Temp:  [98.4 °F (36.9 °C)] 98.4 °F (36.9 °C)  Heart Rate:  [63-72] 68  Resp:  [16-18] 18  BP: (108-147)/(71-99) 109/71  Vital Signs (last 72 hrs)       700  -   0659 700  -   0659 700  -   0659 700  -   1112   Most Recent    Temp (°F) 97.6 -  98.4    97.9 -  99      98.4       98.4 (36.9)    Heart Rate 67 -  88     71 -  81    63 -  72       68    Resp   18      18    16 -  18       18    /62 -  165/99    103/57 -  121/76    108/73 -  147/99       109/71    SpO2 (%) 94 -  99      94    93 -  97      95     95            04/18/21  1913 04/19/21  0100   Weight: 125 kg (275 lb) 129 kg (283 lb 14.4 oz)     Body mass index is 34.57 kg/m².    Intake/Output Summary (Last 24 hours) at 4/21/2021 1112  Last data filed at 4/21/2021 0844  Gross per 24 hour   Intake 1200 ml   Output --   Net 1200 ml     Diet Regular; GI Soft  ----------------------------------------------------------------------------------------------------------------------  Physical exam:    HEENT:  Head:  Normocephalic and atraumatic.     Eyes:  Conjunctivae and EOM are normal.  Pupils are equal, round, and reactive to light.  No scleral icterus.    Neck:  Neck supple.  No JVD present.  No bruit.  Cardiovascular: Normal rate, irregularly irregular regular rhythm, S1 S2+, NO S3 / S4  Pulmonary/Chest:  Vesicular breath sounds B/L, Clear to auscultation, with no added sounds.  Abdominal:  Soft.  Bowel sounds are normal.  No distension and no tenderness.  No organomegaly.  Neurological:  Alert and oriented to person, place, and time. No focal defecits  Skin:  Skin is warm and dry. No rash noted. No pallor.   Musculoskeletal:  No tenderness, and no deformity.  No red or swollen joints anywhere.   Lower extremities: No edema, Peripheral vascular:  2+ Pulses B/L DP.  ----------------------------------------------------------------------------------------------------------------------    ----------------------------------------------------------------------------------------------------------------------      Results from last 7 days   Lab Units 04/19/21  0719 04/19/21  0105 04/18/21  7312   LACTATE mmol/L  --  0.9  --    WBC 10*3/mm3 8.57  --  10.02   HEMOGLOBIN g/dL 15.9  --  17.2   HEMATOCRIT % 48.5  --  53.0*   MCV fL 87.7  --  86.6   MCHC g/dL 32.8  --  32.5    PLATELETS 10*3/mm3 379  --  265   INR   --   --  0.97         Results from last 7 days   Lab Units 04/19/21  0719 04/18/21  2142   SODIUM mmol/L 140 141   POTASSIUM mmol/L 3.6 3.6   CHLORIDE mmol/L 107 102   CO2 mmol/L 24.3 30.2*   BUN mg/dL 10 11   CREATININE mg/dL 1.02 1.15   EGFR IF NONAFRICN AM mL/min/1.73 78 68   CALCIUM mg/dL 8.4* 9.1   GLUCOSE mg/dL 103* 106*   ALBUMIN g/dL  --  3.93   BILIRUBIN mg/dL  --  1.0   ALK PHOS U/L  --  75   AST (SGOT) U/L  --  22   ALT (SGPT) U/L  --  26   Estimated Creatinine Clearance: 130.3 mL/min (by C-G formula based on SCr of 1.02 mg/dL).    No results found for: AMMONIA  Results from last 7 days   Lab Units 04/20/21  0510   CHOLESTEROL mg/dL 65   TRIGLYCERIDES mg/dL 150   HDL CHOL mg/dL 21*   LDL CHOL mg/dL 18     Blood Culture   Date Value Ref Range Status   04/19/2021 No growth at 2 days  Preliminary   04/19/2021 No growth at 2 days  Preliminary     No results found for: URINECX  No results found for: WOUNDCX  No results found for: STOOLCX    I have personally looked at the labs and they are summarized above.  ----------------------------------------------------------------------------------------------------------------------  Imaging Results (Last 24 Hours)     ** No results found for the last 24 hours. **        ----------------------------------------------------------------------------------------------------------------------    Assessment:  1.  New onset atrial fibrillation with controlled ventricular response  2.  No known structural heart disease  3.  2-second pauses and bradycardia on flecainide and metoprolol    Plan:  1.  We will discontinue the flecainide and metoprolol as he is having some pauses and bradycardia  2.  We will continue with anticoagulation  3.  Patient is not willing to have DINORAH cardioversion so patient potentially can be discharged home and after 3 to 4 weeks of anticoagulation he can return for an outpatient direct current cardioversion  4.   Discharge home if okay with surgery      Gerardo Brooks MD   04/21/21 11:12 EDT

## 2021-04-24 LAB
BACTERIA SPEC AEROBE CULT: NORMAL
BACTERIA SPEC AEROBE CULT: NORMAL

## 2021-04-28 LAB
QT INTERVAL: 388 MS
QTC INTERVAL: 388 MS

## 2021-08-09 DIAGNOSIS — I48.0 PAF (PAROXYSMAL ATRIAL FIBRILLATION) (HCC): Primary | ICD-10-CM

## 2021-08-10 ENCOUNTER — OFFICE VISIT (OUTPATIENT)
Dept: CARDIOLOGY | Facility: CLINIC | Age: 51
End: 2021-08-10

## 2021-08-10 VITALS
OXYGEN SATURATION: 98 % | HEIGHT: 76 IN | BODY MASS INDEX: 34.34 KG/M2 | DIASTOLIC BLOOD PRESSURE: 87 MMHG | HEART RATE: 89 BPM | WEIGHT: 282 LBS | SYSTOLIC BLOOD PRESSURE: 120 MMHG

## 2021-08-10 DIAGNOSIS — Z99.89 OSA ON CPAP: ICD-10-CM

## 2021-08-10 DIAGNOSIS — I48.91 ATRIAL FIBRILLATION, UNSPECIFIED TYPE (HCC): Primary | ICD-10-CM

## 2021-08-10 DIAGNOSIS — G47.33 OSA ON CPAP: ICD-10-CM

## 2021-08-10 PROCEDURE — 93000 ELECTROCARDIOGRAM COMPLETE: CPT | Performed by: PHYSICIAN ASSISTANT

## 2021-08-10 PROCEDURE — 99213 OFFICE O/P EST LOW 20 MIN: CPT | Performed by: INTERNAL MEDICINE

## 2021-08-10 RX ORDER — MELOXICAM 7.5 MG/1
7.5 TABLET ORAL
COMMUNITY
Start: 2021-07-19

## 2021-08-10 RX ORDER — HYDROXYZINE HYDROCHLORIDE 25 MG/1
25 TABLET, FILM COATED ORAL NIGHTLY PRN
COMMUNITY
End: 2022-07-07

## 2021-08-10 RX ORDER — TRAMADOL HYDROCHLORIDE 50 MG/1
TABLET ORAL 2 TIMES DAILY PRN
COMMUNITY
Start: 2021-07-19 | End: 2022-01-06

## 2021-08-10 NOTE — PROGRESS NOTES
[facility]  INITIAL OFFICE CONSULT    Lloyd Loza  : 1970  MRN:[id number]  Patient Address: [address] [address]    Date of Encounter: 08/10/2021    PCP: Sintia Ly MD  [facility] [place] [id number]  Referring MD: Dr. Ly    IDENTIFICATION: A 50 y.o. male resident of [facility]     Chief Complaint   Patient presents with   • Atrial Fibrillation     Consult     PROBLEM LIST:   1. Atrial fibrillation   a. New diagnosis 2021 while hospitalized for gastroenteritis, asymptomatic   b. Flecainide and Metoprolol added and discontinued after reported 2 sec. pauses  c. Echo 21: EF 51-55%, left atrium is enlarged, mild MR  wanda. CHADsVASC=0, anticoagulated with Xarelto   2. Obstructive sleep apnea on CPAP   3. Psoriatic arthritis   4. Degenerative disc disease with chronic back pain     ALLERGIES:   Allergies   Allergen Reactions   • Penicillins Hives     Current Outpatient Medications   Medication Instructions   • hydrOXYzine (ATARAX) 25 mg, Oral, Nightly PRN   • meloxicam (MOBIC) 7.5 mg, Oral, Every Night at Bedtime   • rivaroxaban (XARELTO) 20 mg, Oral, Daily With Dinner   • traMADol (ULTRAM) 50 MG tablet 2 Times Daily PRN   • Tremfya 100 mg, Subcutaneous, Take As Directed, Every 8 weeks     HPI: Mr. Loza is a pleasant 51 y/o obese WM who is referred from Dr. Ly for newly diagnosed atrial fibrillation. He was hospitalized in April for gastroenteritis and was found to be in rate controlled atrial fibrillation. He was asymptomatic and unaware of the atrial fib, and denies prior history. He specifically denies any chest pain, unusual dyspnea, palpitations, syncope or heart failure symptoms. He was started on Xarelto for anticoagulation and also Flecainide 50mg BID and Metoprolol 12.5mg twice daily, however he reportedly developed bradycardia and 2 second pauses and his Flecainide and Metoprolol were discontinued. DINORAH/ECV  "was recommended however patient declined at the time. He denies tobacco, alcohol or drug use. He denies prior history of cardiovascular, cerebrovascular or peripheral vascular disease. He received his first dose of Pfizer vaccine this past weekend.      Cardiac Risk Factors include: male gender and obesity (BMI >= 30 kg/m2)    ROS: All systems have been reviewed and are negative with the exception of those mentioned in the HPI and problem list above.    History reviewed. No pertinent surgical history.    Social History     Socioeconomic History   • Marital status:    Tobacco Use   • Smoking status: Never Smoker   • Smokeless tobacco: Never Used   Vaping Use   • Vaping Use: Never used   Substance and Sexual Activity   • Alcohol use: Never   • Drug use: Never   • Sexual activity: Defer       Family History   Problem Relation Age of Onset   • Heart attack Father        Objective     Vitals:    08/10/21 1303 08/10/21 1309 08/10/21 1310 08/10/21 1311   BP: 110/75 121/77 104/70 120/87   BP Location: Right arm Right arm Left arm Left arm   Patient Position: Sitting Standing Sitting Standing   Pulse: 83 66 82 89   SpO2: 98%      Weight: 128 kg (282 lb)      Height: 193 cm (76\")        Body mass index is 34.33 kg/m².    PHYSICAL EXAM:  CONSTITUTIONAL: Well-nourished, cooperative, in no acute distress  HEENT: Normocephalic, atraumatic, PERRLA, no JVD  CARDIOVASCULAR:  Irregular rhythm and normal rate, no murmur, gallop, rub.   RESPIRATORY: Clear to auscultation, normal respiratory effort, no wheezing, rales or ronchi  GI: Soft, nontender, normal bowel sounds  EXTREMITIES: No gross deformities, no edema. Peripheral pulses are present and equal bilaterally  SKIN: Warm, dry. No bleeding, bruising or rash  NEUROLOGICAL: No focal deficits  PSYCHIATRIC: Normal mood and affect. Behavior is normal     Labs/Diagnostic Data  Lab Results   Component Value Date    WBC 8.57 04/19/2021    HGB 15.9 04/19/2021    HCT 48.5 04/19/2021 "    MCV 87.7 04/19/2021     04/19/2021     Lab Results   Component Value Date    GLUCOSE 103 (H) 04/19/2021    BUN 10 04/19/2021    CREATININE 1.02 04/19/2021    EGFRIFNONA 78 04/19/2021    BCR 9.8 04/19/2021    K 3.6 04/19/2021    CO2 24.3 04/19/2021    CALCIUM 8.4 (L) 04/19/2021    ALBUMIN 3.93 04/18/2021    AST 22 04/18/2021    ALT 26 04/18/2021     Lab Results   Component Value Date    CHOL 65 04/20/2021    TRIG 150 04/20/2021    HDL 21 (L) 04/20/2021    LDL 18 04/20/2021     Lab Results   Component Value Date    TSH 1.040 04/20/2021       ECG 12 Lead    Date/Time: 8/10/2021 3:18 PM  Performed by: Alison Call PA-C  Authorized by: Alison Call PA-C   Comparison: compared with previous ECG from 4/20/2021  Similar to previous ECG  Rhythm: atrial fibrillation  Rate: normal  BPM: 72  Conduction: conduction normal  Other findings: non-specific ST-T wave changes    Clinical impression: abnormal EKG        Radiology Data:   CXR: No acute cardiopulmonary process. Official report pending.     Assessment and Plan:     1. Atrial fibrillation: he is asymptomatic. Rate is controlled and he is anticoagulated with Xarelto. We discussed treatment options and recommended trial of ECV to resume NSR. He is agreeable, and we will schedule this in [place].  2. Obesity/EDGAR: weight loss encouraged. Continue CPAP.  3. Follow up will be arranged following cardioversion.      Scribed for Renan Abraham MD by Alison Call PA-C. 8/10/2021  15:19 EDT

## 2021-09-23 ENCOUNTER — PREP FOR SURGERY (OUTPATIENT)
Dept: OTHER | Facility: HOSPITAL | Age: 51
End: 2021-09-23

## 2021-09-23 RX ORDER — ACETAMINOPHEN 325 MG/1
650 TABLET ORAL EVERY 4 HOURS PRN
Status: CANCELLED | OUTPATIENT
Start: 2021-09-23

## 2021-09-23 RX ORDER — SODIUM CHLORIDE 0.9 % (FLUSH) 0.9 %
10 SYRINGE (ML) INJECTION AS NEEDED
Status: CANCELLED | OUTPATIENT
Start: 2021-09-23

## 2021-09-23 RX ORDER — SODIUM CHLORIDE 0.9 % (FLUSH) 0.9 %
3 SYRINGE (ML) INJECTION EVERY 12 HOURS SCHEDULED
Status: CANCELLED | OUTPATIENT
Start: 2021-09-23

## 2021-09-24 ENCOUNTER — HOSPITAL ENCOUNTER (OUTPATIENT)
Dept: CARDIOLOGY | Facility: HOSPITAL | Age: 51
Discharge: HOME | End: 2021-09-24
Attending: INTERNAL MEDICINE | Admitting: INTERNAL MEDICINE

## 2021-09-24 VITALS
HEIGHT: 76 IN | SYSTOLIC BLOOD PRESSURE: 119 MMHG | TEMPERATURE: 97.6 F | RESPIRATION RATE: 16 BRPM | DIASTOLIC BLOOD PRESSURE: 89 MMHG | OXYGEN SATURATION: 93 % | HEART RATE: 73 BPM | WEIGHT: 282 LBS | BODY MASS INDEX: 34.34 KG/M2

## 2021-09-24 DIAGNOSIS — I48.91 ATRIAL FIBRILLATION, UNSPECIFIED TYPE (HCC): ICD-10-CM

## 2021-09-24 LAB
ALBUMIN SERPL-MCNC: 4.1 G/DL (ref 3.5–5.2)
ALBUMIN/GLOB SERPL: 1.1 G/DL
ALP SERPL-CCNC: 79 U/L (ref 39–117)
ALT SERPL W P-5'-P-CCNC: 17 U/L (ref 1–41)
ANION GAP SERPL CALCULATED.3IONS-SCNC: 9 MMOL/L (ref 5–15)
AST SERPL-CCNC: 16 U/L (ref 1–40)
BILIRUB SERPL-MCNC: 0.6 MG/DL (ref 0–1.2)
BUN SERPL-MCNC: 13 MG/DL (ref 6–20)
BUN/CREAT SERPL: 11.4 (ref 7–25)
CALCIUM SPEC-SCNC: 8.9 MG/DL (ref 8.6–10.5)
CHLORIDE SERPL-SCNC: 106 MMOL/L (ref 98–107)
CO2 SERPL-SCNC: 25 MMOL/L (ref 22–29)
CREAT SERPL-MCNC: 1.14 MG/DL (ref 0.76–1.27)
DEPRECATED RDW RBC AUTO: 41.1 FL (ref 37–54)
ERYTHROCYTE [DISTWIDTH] IN BLOOD BY AUTOMATED COUNT: 13.5 % (ref 12.3–15.4)
GFR SERPL CREATININE-BSD FRML MDRD: 69 ML/MIN/1.73
GLOBULIN UR ELPH-MCNC: 3.7 GM/DL
GLUCOSE SERPL-MCNC: 113 MG/DL (ref 65–99)
HCT VFR BLD AUTO: 52 % (ref 37.5–51)
HGB BLD-MCNC: 17.3 G/DL (ref 13–17.7)
MCH RBC QN AUTO: 28.1 PG (ref 26.6–33)
MCHC RBC AUTO-ENTMCNC: 33.3 G/DL (ref 31.5–35.7)
MCV RBC AUTO: 84.6 FL (ref 79–97)
PLATELET # BLD AUTO: 245 10*3/MM3 (ref 140–450)
PMV BLD AUTO: 10.1 FL (ref 6–12)
POTASSIUM SERPL-SCNC: 4 MMOL/L (ref 3.5–5.2)
PROT SERPL-MCNC: 7.8 G/DL (ref 6–8.5)
RBC # BLD AUTO: 6.15 10*6/MM3 (ref 4.14–5.8)
SODIUM SERPL-SCNC: 140 MMOL/L (ref 136–145)
WBC # BLD AUTO: 8.95 10*3/MM3 (ref 3.4–10.8)

## 2021-09-24 PROCEDURE — 93005 ELECTROCARDIOGRAM TRACING: CPT | Performed by: PHYSICIAN ASSISTANT

## 2021-09-24 PROCEDURE — 92960 CARDIOVERSION ELECTRIC EXT: CPT | Performed by: INTERNAL MEDICINE

## 2021-09-24 PROCEDURE — 25010000002 MIDAZOLAM PER 1 MG: Performed by: INTERNAL MEDICINE

## 2021-09-24 PROCEDURE — 93005 ELECTROCARDIOGRAM TRACING: CPT | Performed by: INTERNAL MEDICINE

## 2021-09-24 PROCEDURE — 93010 ELECTROCARDIOGRAM REPORT: CPT | Performed by: INTERNAL MEDICINE

## 2021-09-24 PROCEDURE — 85027 COMPLETE CBC AUTOMATED: CPT | Performed by: PHYSICIAN ASSISTANT

## 2021-09-24 PROCEDURE — 80053 COMPREHEN METABOLIC PANEL: CPT | Performed by: PHYSICIAN ASSISTANT

## 2021-09-24 PROCEDURE — 92960 CARDIOVERSION ELECTRIC EXT: CPT

## 2021-09-24 PROCEDURE — 36415 COLL VENOUS BLD VENIPUNCTURE: CPT

## 2021-09-24 RX ORDER — ACETAMINOPHEN 325 MG/1
650 TABLET ORAL EVERY 4 HOURS PRN
Status: DISCONTINUED | OUTPATIENT
Start: 2021-09-24 | End: 2021-09-24 | Stop reason: HOSPADM

## 2021-09-24 RX ORDER — MIDAZOLAM HYDROCHLORIDE 1 MG/ML
INJECTION INTRAMUSCULAR; INTRAVENOUS
Status: COMPLETED | OUTPATIENT
Start: 2021-09-24 | End: 2021-09-24

## 2021-09-24 RX ORDER — SODIUM CHLORIDE 0.9 % (FLUSH) 0.9 %
3 SYRINGE (ML) INJECTION EVERY 12 HOURS SCHEDULED
Status: DISCONTINUED | OUTPATIENT
Start: 2021-09-24 | End: 2021-09-24 | Stop reason: HOSPADM

## 2021-09-24 RX ORDER — FLUMAZENIL 0.1 MG/ML
INJECTION INTRAVENOUS
Status: DISCONTINUED
Start: 2021-09-24 | End: 2021-09-24 | Stop reason: WASHOUT

## 2021-09-24 RX ORDER — SODIUM CHLORIDE 0.9 % (FLUSH) 0.9 %
10 SYRINGE (ML) INJECTION AS NEEDED
Status: DISCONTINUED | OUTPATIENT
Start: 2021-09-24 | End: 2021-09-24 | Stop reason: HOSPADM

## 2021-09-24 RX ORDER — FENTANYL CITRATE 50 UG/ML
INJECTION, SOLUTION INTRAMUSCULAR; INTRAVENOUS
Status: DISCONTINUED
Start: 2021-09-24 | End: 2021-09-24 | Stop reason: WASHOUT

## 2021-09-24 RX ORDER — MIDAZOLAM HYDROCHLORIDE 1 MG/ML
INJECTION INTRAMUSCULAR; INTRAVENOUS
Status: DISCONTINUED
Start: 2021-09-24 | End: 2021-09-24 | Stop reason: HOSPADM

## 2021-09-24 RX ORDER — NALOXONE HYDROCHLORIDE 0.4 MG/ML
INJECTION, SOLUTION INTRAMUSCULAR; INTRAVENOUS; SUBCUTANEOUS
Status: DISCONTINUED
Start: 2021-09-24 | End: 2021-09-24 | Stop reason: WASHOUT

## 2021-09-24 RX ADMIN — METHOHEXITAL SODIUM 20 MG: 500 INJECTION, POWDER, LYOPHILIZED, FOR SOLUTION INTRAMUSCULAR; INTRAVENOUS; RECTAL at 10:05

## 2021-09-24 RX ADMIN — MIDAZOLAM 2 MG: 1 INJECTION INTRAMUSCULAR; INTRAVENOUS at 10:04

## 2021-09-24 NOTE — PROCEDURES
Diagnosis:  Atrial fibrillation or Flutter    PROCEDURE PERFORMED: External electrical cardioversion.    Anesthesia: Sedation with:  1. 2mg Versed  2. 40 mg Brevital    Estimated Blood Loss: Less than 10 mL     Specimens: None    PROCEDURE IN DETAIL: The patient was brought into the CV[facility] in a fasting  state. The patient was given moderate sedation during which he received 200  joules of external electrical cardioversion that converted atrial  fibrillation to normal sinus heart rhythm.  The patient has been continuously anticoagulated on Xarelto    IMPRESSION: Successful conversion of atrial fibrillation to normal sinus  heart rhythm.

## 2021-09-24 NOTE — H&P
[facility] Cardiology   [address], [address]  [place], [place], [address]    [phone]  WWW[email or web address]           HISTORY & PHYSICAL NOTE    Patient Care Team:  Patient Care Team:  Sintia Ly MD as PCP - General (Family Medicine)    Chief complaint: AFib         HPI:    Lloyd Loza is a 50 y.o. male.    Cardiac focused problem list:  1. Atrial fibrillation   a. New diagnosis April 2021 while hospitalized for gastroenteritis, asymptomatic   b. Flecainide and Metoprolol added and discontinued after reported 2 sec. pauses  c. Echo 4/19/21: EF 51-55%, left atrium is enlarged, mild MR  d. CHADsVASC=0, anticoagulated with Xarelto   2. Obstructive sleep apnea on CPAP   3. Psoriatic arthritis   4. Degenerative disc disease with chronic back pain     The patient presents today for cardioversion.  He was initially diagnosed with atrial fibrillation in 4/2021.  He has previously been treated with flecainide and metoprolol that was discontinued after reported 2-second pauses.  He was seen by his primary cardiologist Dr. Abraham last month and was in rate controlled atrial fibrillation.  He was recommended cardioversion at that time to resume sinus rhythm.  He reports compliance with his Xarelto.    Review of Systems:  Negative for exertional chest pain, dyspnea with exertion, orthopnea, PND, lower extremity edema, palpitations, lightheadedness, syncope.   All other systems reviewed and are negative.    PFSH:  Patient Active Problem List   Diagnosis   • Small bowel obstruction (CMS/HCC)   • Atrial fibrillation (HCC)       No current facility-administered medications on file prior to encounter.     Current Outpatient Medications on File Prior to Encounter   Medication Sig Dispense Refill   • hydrOXYzine (ATARAX) 25 MG tablet Take 25 mg by mouth At Night As Needed for Itching.     • meloxicam (MOBIC) 7.5 MG tablet Take 7.5 mg by mouth every night at bedtime.     • rivaroxaban (XARELTO) 20  MG tablet Take 1 tablet by mouth Daily With Dinner. Indications: Atrial Fibrillation 30 tablet 1   • traMADol (ULTRAM) 50 MG tablet 2 (Two) Times a Day As Needed.     • Guselkumab (Tremfya) 100 MG/ML solution pen-injector Inject 100 mg under the skin into the appropriate area as directed Take As Directed. Every 8 weeks       Allergies   Allergen Reactions   • Penicillins Hives       Social History     Socioeconomic History   • Marital status:      Spouse name: Not on file   • Number of children: Not on file   • Years of education: Not on file   • Highest education level: Not on file   Tobacco Use   • Smoking status: Never Smoker   • Smokeless tobacco: Never Used   Vaping Use   • Vaping Use: Never used   Substance and Sexual Activity   • Alcohol use: Never   • Drug use: Never   • Sexual activity: Defer     Family History   Problem Relation Age of Onset   • No Known Problems Mother    • Heart attack Father    • Heart disease Father             Objective:     Vital Sign Min/Max for last 24 hours  Temp  Min: 97.6 °F (36.4 °C)  Max: 97.6 °F (36.4 °C)   BP  Min: 137/97  Max: 142/112   Pulse  Min: 71  Max: 89   Resp  Min: 16  Max: 16   SpO2  Min: 97 %  Max: 98 %   No data recorded    No intake or output data in the 24 hours ending 09/24/21 0953        Vitals:    09/24/21 0915   BP: (!) 142/112   Pulse: 89   Resp: 16   Temp: 97.6 °F (36.4 °C)   SpO2: 98%       CONSTITUTIONAL: Well-nourished. In no acute distress.   SKIN: Warm and dry. No rashes noted  LUNGS: Normal effort. Clear to auscultation bilaterally without wheezing, rhonchi, or rales noted.   CARDIOVASCULAR: The heart has a irregularly irregular rate and rhythm with a normal S1 and S2. There is no murmur, gallop, rub, or click appreciated.   PERIPHERAL VASCULAR:Radial pulses are 2+ bilaterally.   PSYCHIATRIC: Alert, orientated x 3, appropriate affect and mood    Labs, results reviewed by myself:  No results found for: CKTOTAL, CKMB, CKMBINDEX, TROPONINI,  TROPONINT    No results found for: GLUCOSE, BUN, CREATININE, NA, K, CL, CO2, CALCIUM, PROTEINTOT, ALBUMIN, ALT, AST, ALKPHOS, BILITOT, EGFRIFNONA, LABIL2, BCR, ANIONGAP    Lab Results   Component Value Date    CHOL 65 04/20/2021     Lab Results   Component Value Date    TRIG 150 04/20/2021     Lab Results   Component Value Date    HDL 21 (L) 04/20/2021     Lab Results   Component Value Date    LDL 18 04/20/2021     No components found for: LDLDIRECTC      WBC   Date Value Ref Range Status   09/24/2021 8.95 3.40 - 10.80 10*3/mm3 Final     RBC   Date Value Ref Range Status   09/24/2021 6.15 (H) 4.14 - 5.80 10*6/mm3 Final     Hemoglobin   Date Value Ref Range Status   09/24/2021 17.3 13.0 - 17.7 g/dL Final     Hematocrit   Date Value Ref Range Status   09/24/2021 52.0 (H) 37.5 - 51.0 % Final     MCV   Date Value Ref Range Status   09/24/2021 84.6 79.0 - 97.0 fL Final     MCH   Date Value Ref Range Status   09/24/2021 28.1 26.6 - 33.0 pg Final     MCHC   Date Value Ref Range Status   09/24/2021 33.3 31.5 - 35.7 g/dL Final     RDW   Date Value Ref Range Status   09/24/2021 13.5 12.3 - 15.4 % Final     RDW-SD   Date Value Ref Range Status   09/24/2021 41.1 37.0 - 54.0 fl Final     MPV   Date Value Ref Range Status   09/24/2021 10.1 6.0 - 12.0 fL Final     Platelets   Date Value Ref Range Status   09/24/2021 245 140 - 450 10*3/mm3 Final         Diagnostic Data:     Results for orders placed during the hospital encounter of 04/18/21    Adult Transthoracic Echo Complete W/ Cont if Necessary Per Protocol    Interpretation Summary  · Normal left ventricular cavity size and wall thickness noted. All left ventricular wall segments contract normally.  · Left ventricular ejection fraction appears to be 51 - 55%.  · The aortic valve is not well visualized. No aortic valve regurgitation or stenosis is present. The aortic valve is grossly normal in structure.  · The mitral valve is structurally normal with no significant stenosis  present. Mild mitral valve regurgitation is present.  · There is no evidence of pericardial effusion.      Tele: Atrial fibrillation         Assessment and Plan:     ASSESSMENT:  1. Atrial fibrillation  a. Anticoagulated with Xarelto.  b. Prior intolerance to flecainide and metoprolol due to 2-second pauses.  2. Obstructive sleep apnea with CPAP use    PLAN:  1. Cardioversion today with Dr. Giron.The risks, benefits, and alternatives of the procedure have been reviewed and the patient wishes to proceed.     Electronically signed by CRISPIN Hutton, 09/24/21, 9:55 AM EDT.    Patient underwent successful cardioversion to sinus rhythm.  In order to help maintain sinus rhythm we will try adding back just metoprolol alone.  He should continue on Xarelto

## 2021-10-01 LAB
QT INTERVAL: 338 MS
QT INTERVAL: 364 MS
QTC INTERVAL: 389 MS
QTC INTERVAL: 398 MS

## 2022-01-05 NOTE — PROGRESS NOTES
[facility] Cardiology  Follow Up Visit  Lloyd Loza  1970    VISIT DATE:  01/06/22    PCP:   Sintia Ly MD  [address] [address] [place] [address]          CC:  Atrial Fibrillation, unspecified type      Problem List:  1. Atrial fibrillation   a. New diagnosis April 2021 while hospitalized for gastroenteritis, asymptomatic   b. Flecainide and Metoprolol added and discontinued after reported 2 sec. pauses  c. Echo 4/19/21: EF 51-55%, left atrium is enlarged, mild MR  d. CHADsVASC=0, anticoagulated with Xarelto       2. Obstructive sleep apnea on CPAP   3. Psoriatic arthritis   4. Degenerative disc disease with chronic back pain       History of Present Illness:  Lloyd Loza  Is a 51 y.o. male with pertinent cardiac history detailed above.  Patient has A. fib first diagnosed in April 2021.  He had prior pauses when he was on flecainide and metoprolol.  He underwent a cardioversion in September.  He feels well today without any complaints of dyspnea, decreased exercise capacity, palpitations or edema.  EKG obtained today shows he is back in a rate controlled atrial fibrillation, unclear how long the cardioversion in September was maintained.  He had normal LVEF and mild MR on his last echo, no other significant valvular abnormalities.  He has been compliant with Xarelto.  No complaints of chest pain.      Patient Active Problem List    Diagnosis Date Noted   • Atrial fibrillation (HCC) 09/24/2021   • Small bowel obstruction (HCC) 04/19/2021       Allergies   Allergen Reactions   • Penicillins Hives       Social History     Socioeconomic History   • Marital status:    Tobacco Use   • Smoking status: Never Smoker   • Smokeless tobacco: Never Used   Vaping Use   • Vaping Use: Never used   Substance and Sexual Activity   • Alcohol use: Never   • Drug use: Never   • Sexual activity: Defer       Family History   Problem Relation Age of Onset   • No Known Problems Mother    •  "Heart attack Father    • Heart disease Father        Current Medications:    Current Outpatient Medications:   •  Guselkumab (Tremfya) 100 MG/ML solution pen-injector, Inject 100 mg under the skin into the appropriate area as directed Take As Directed. Every 8 weeks, Disp: , Rfl:   •  hydrOXYzine (ATARAX) 25 MG tablet, Take 25 mg by mouth At Night As Needed for Itching., Disp: , Rfl:   •  meloxicam (MOBIC) 7.5 MG tablet, Take 7.5 mg by mouth every night at bedtime., Disp: , Rfl:   •  metoprolol tartrate (LOPRESSOR) 25 MG tablet, Take 1 tablet by mouth 2 (Two) Times a Day., Disp: 60 tablet, Rfl: 11  •  rivaroxaban (XARELTO) 20 MG tablet, Take 1 tablet by mouth Daily With Dinner. Indications: Atrial Fibrillation, Disp: 90 tablet, Rfl: 3     Review of Systems   Cardiovascular: Negative for chest pain, dyspnea on exertion, irregular heartbeat, near-syncope and palpitations.   Respiratory: Negative for cough and shortness of breath.        Vitals:    01/06/22 1045   BP: 134/88   BP Location: Left arm   Patient Position: Sitting   Cuff Size: Adult   Pulse: 57   SpO2: 96%   Weight: 123 kg (272 lb)   Height: 193 cm (76\")       Physical Exam  Constitutional:       Appearance: Normal appearance.   Cardiovascular:      Rate and Rhythm: Normal rate. Rhythm irregular.      Pulses: Normal pulses.      Heart sounds: Normal heart sounds.   Pulmonary:      Effort: Pulmonary effort is normal.      Breath sounds: Normal breath sounds.   Musculoskeletal:      Right lower leg: No edema.      Left lower leg: No edema.   Neurological:      General: No focal deficit present.      Mental Status: He is alert.         Diagnostic Data:    ECG 12 Lead    Date/Time: 1/6/2022 11:09 AM  Performed by: Alok Giron MD  Authorized by: Alok Giron MD   Comparison: compared with previous ECG from 10/1/2021  Comparison to previous ECG: Patient is back in atrial fibrillation  Rhythm: atrial fibrillation  Rate: normal  BPM: 64  QRS " axis: normal  Other findings: non-specific ST-T wave changes    Clinical impression: abnormal EKG          Lab Results   Component Value Date    TRIG 150 04/20/2021    HDL 21 (L) 04/20/2021     Lab Results   Component Value Date    GLUCOSE 113 (H) 09/24/2021    BUN 13 09/24/2021    CREATININE 1.14 09/24/2021     09/24/2021    K 4.0 09/24/2021     09/24/2021    CO2 25.0 09/24/2021     No results found for: HGBA1C  Lab Results   Component Value Date    WBC 8.95 09/24/2021    HGB 17.3 09/24/2021    HCT 52.0 (H) 09/24/2021     09/24/2021       Assessment:   Diagnosis Plan   1. Atrial fibrillation, unspecified type (HCC)         Plan:      1.  Atrial fibrillation  -On Xarelto, metoprolol 25 mg twice daily  -[id number]  -Cardioversion in September to sinus rhythm but back in atrial fibrillation at this time  -reported 2-second pauses while on metoprolol/flecainide  -At this time patient is rate controlled and completely asymptomatic, will continue with rate control strategy, will obtain an echocardiogram in about 4 months to ensure there is no decline in LV function.  Follow-up in June    2.  Sleep apnea, on CPAP      Alok Giron MD [facility]

## 2022-01-06 ENCOUNTER — OFFICE VISIT (OUTPATIENT)
Dept: CARDIOLOGY | Facility: CLINIC | Age: 52
End: 2022-01-06

## 2022-01-06 VITALS
SYSTOLIC BLOOD PRESSURE: 134 MMHG | OXYGEN SATURATION: 96 % | WEIGHT: 272 LBS | HEART RATE: 57 BPM | BODY MASS INDEX: 33.12 KG/M2 | DIASTOLIC BLOOD PRESSURE: 88 MMHG | HEIGHT: 76 IN

## 2022-01-06 DIAGNOSIS — I48.91 ATRIAL FIBRILLATION, UNSPECIFIED TYPE: Primary | ICD-10-CM

## 2022-01-06 PROCEDURE — 99214 OFFICE O/P EST MOD 30 MIN: CPT | Performed by: INTERNAL MEDICINE

## 2022-01-06 PROCEDURE — 93000 ELECTROCARDIOGRAM COMPLETE: CPT | Performed by: INTERNAL MEDICINE

## 2022-05-28 ENCOUNTER — APPOINTMENT (OUTPATIENT)
Dept: CT IMAGING | Facility: HOSPITAL | Age: 52
End: 2022-05-28

## 2022-05-28 ENCOUNTER — HOSPITAL ENCOUNTER (EMERGENCY)
Facility: HOSPITAL | Age: 52
Discharge: HOME | End: 2022-05-28
Attending: EMERGENCY MEDICINE | Admitting: EMERGENCY MEDICINE

## 2022-05-28 VITALS
HEIGHT: 76 IN | SYSTOLIC BLOOD PRESSURE: 145 MMHG | OXYGEN SATURATION: 98 % | DIASTOLIC BLOOD PRESSURE: 93 MMHG | HEART RATE: 58 BPM | WEIGHT: 275 LBS | RESPIRATION RATE: 16 BRPM | BODY MASS INDEX: 33.49 KG/M2 | TEMPERATURE: 97.1 F

## 2022-05-28 DIAGNOSIS — N20.1 RIGHT URETERAL STONE: Primary | ICD-10-CM

## 2022-05-28 LAB
ALBUMIN SERPL-MCNC: 4.13 G/DL (ref 3.5–5.2)
ALBUMIN/GLOB SERPL: 1.2 G/DL
ALP SERPL-CCNC: 77 U/L (ref 39–117)
ALT SERPL W P-5'-P-CCNC: 14 U/L (ref 1–41)
AMYLASE SERPL-CCNC: 32 U/L (ref 28–100)
ANION GAP SERPL CALCULATED.3IONS-SCNC: 10.4 MMOL/L (ref 5–15)
APTT PPP: 34.8 SECONDS (ref 26.5–34.5)
AST SERPL-CCNC: 12 U/L (ref 1–40)
BASOPHILS # BLD AUTO: 0.06 10*3/MM3 (ref 0–0.2)
BASOPHILS NFR BLD AUTO: 0.6 % (ref 0–1.5)
BILIRUB SERPL-MCNC: 1.1 MG/DL (ref 0–1.2)
BILIRUB UR QL STRIP: NEGATIVE
BUN SERPL-MCNC: 17 MG/DL (ref 6–20)
BUN/CREAT SERPL: 9.1 (ref 7–25)
CALCIUM SPEC-SCNC: 9 MG/DL (ref 8.6–10.5)
CHLORIDE SERPL-SCNC: 103 MMOL/L (ref 98–107)
CLARITY UR: CLEAR
CO2 SERPL-SCNC: 26.6 MMOL/L (ref 22–29)
COLOR UR: YELLOW
CREAT SERPL-MCNC: 1.87 MG/DL (ref 0.76–1.27)
CRP SERPL-MCNC: 2.61 MG/DL (ref 0–0.5)
DEPRECATED RDW RBC AUTO: 41.4 FL (ref 37–54)
EGFRCR SERPLBLD CKD-EPI 2021: 43.5 ML/MIN/1.73
EOSINOPHIL # BLD AUTO: 0.24 10*3/MM3 (ref 0–0.4)
EOSINOPHIL NFR BLD AUTO: 2.3 % (ref 0.3–6.2)
ERYTHROCYTE [DISTWIDTH] IN BLOOD BY AUTOMATED COUNT: 13.2 % (ref 12.3–15.4)
GLOBULIN UR ELPH-MCNC: 3.5 GM/DL
GLUCOSE SERPL-MCNC: 107 MG/DL (ref 65–99)
GLUCOSE UR STRIP-MCNC: NEGATIVE MG/DL
HCT VFR BLD AUTO: 51.8 % (ref 37.5–51)
HGB BLD-MCNC: 17 G/DL (ref 13–17.7)
HGB UR QL STRIP.AUTO: NEGATIVE
HOLD SPECIMEN: NORMAL
HOLD SPECIMEN: NORMAL
IMM GRANULOCYTES # BLD AUTO: 0.06 10*3/MM3 (ref 0–0.05)
IMM GRANULOCYTES NFR BLD AUTO: 0.6 % (ref 0–0.5)
INR PPP: 1 (ref 0.9–1.1)
KETONES UR QL STRIP: NEGATIVE
LEUKOCYTE ESTERASE UR QL STRIP.AUTO: NEGATIVE
LIPASE SERPL-CCNC: 30 U/L (ref 13–60)
LYMPHOCYTES # BLD AUTO: 2.07 10*3/MM3 (ref 0.7–3.1)
LYMPHOCYTES NFR BLD AUTO: 19.4 % (ref 19.6–45.3)
MAGNESIUM SERPL-MCNC: 2 MG/DL (ref 1.6–2.6)
MCH RBC QN AUTO: 28.3 PG (ref 26.6–33)
MCHC RBC AUTO-ENTMCNC: 32.8 G/DL (ref 31.5–35.7)
MCV RBC AUTO: 86.2 FL (ref 79–97)
MONOCYTES # BLD AUTO: 0.82 10*3/MM3 (ref 0.1–0.9)
MONOCYTES NFR BLD AUTO: 7.7 % (ref 5–12)
NEUTROPHILS NFR BLD AUTO: 69.4 % (ref 42.7–76)
NEUTROPHILS NFR BLD AUTO: 7.4 10*3/MM3 (ref 1.7–7)
NITRITE UR QL STRIP: NEGATIVE
NRBC BLD AUTO-RTO: 0 /100 WBC (ref 0–0.2)
PH UR STRIP.AUTO: 5.5 [PH] (ref 5–8)
PLATELET # BLD AUTO: 227 10*3/MM3 (ref 140–450)
PMV BLD AUTO: 9.8 FL (ref 6–12)
POTASSIUM SERPL-SCNC: 3.8 MMOL/L (ref 3.5–5.2)
PROT SERPL-MCNC: 7.6 G/DL (ref 6–8.5)
PROT UR QL STRIP: NEGATIVE
PROTHROMBIN TIME: 13.4 SECONDS (ref 12.1–14.7)
QT INTERVAL: 364 MS
QTC INTERVAL: 366 MS
RBC # BLD AUTO: 6.01 10*6/MM3 (ref 4.14–5.8)
SODIUM SERPL-SCNC: 140 MMOL/L (ref 136–145)
SP GR UR STRIP: 1.02 (ref 1–1.03)
UROBILINOGEN UR QL STRIP: NORMAL
WBC NRBC COR # BLD: 10.65 10*3/MM3 (ref 3.4–10.8)
WHOLE BLOOD HOLD COAG: NORMAL
WHOLE BLOOD HOLD SPECIMEN: NORMAL

## 2022-05-28 PROCEDURE — 86140 C-REACTIVE PROTEIN: CPT | Performed by: PHYSICIAN ASSISTANT

## 2022-05-28 PROCEDURE — 25010000002 HYDROMORPHONE 1 MG/ML SOLUTION

## 2022-05-28 PROCEDURE — 74176 CT ABD & PELVIS W/O CONTRAST: CPT | Performed by: RADIOLOGY

## 2022-05-28 PROCEDURE — 82150 ASSAY OF AMYLASE: CPT | Performed by: PHYSICIAN ASSISTANT

## 2022-05-28 PROCEDURE — 99284 EMERGENCY DEPT VISIT MOD MDM: CPT

## 2022-05-28 PROCEDURE — 81003 URINALYSIS AUTO W/O SCOPE: CPT | Performed by: PHYSICIAN ASSISTANT

## 2022-05-28 PROCEDURE — 80053 COMPREHEN METABOLIC PANEL: CPT | Performed by: PHYSICIAN ASSISTANT

## 2022-05-28 PROCEDURE — 85610 PROTHROMBIN TIME: CPT | Performed by: PHYSICIAN ASSISTANT

## 2022-05-28 PROCEDURE — 93010 ELECTROCARDIOGRAM REPORT: CPT | Performed by: INTERNAL MEDICINE

## 2022-05-28 PROCEDURE — 96374 THER/PROPH/DIAG INJ IV PUSH: CPT

## 2022-05-28 PROCEDURE — 85730 THROMBOPLASTIN TIME PARTIAL: CPT | Performed by: PHYSICIAN ASSISTANT

## 2022-05-28 PROCEDURE — 74176 CT ABD & PELVIS W/O CONTRAST: CPT

## 2022-05-28 PROCEDURE — 83690 ASSAY OF LIPASE: CPT | Performed by: PHYSICIAN ASSISTANT

## 2022-05-28 PROCEDURE — 25010000002 ONDANSETRON PER 1 MG

## 2022-05-28 PROCEDURE — 83735 ASSAY OF MAGNESIUM: CPT | Performed by: PHYSICIAN ASSISTANT

## 2022-05-28 PROCEDURE — 96375 TX/PRO/DX INJ NEW DRUG ADDON: CPT

## 2022-05-28 PROCEDURE — 85025 COMPLETE CBC W/AUTO DIFF WBC: CPT | Performed by: PHYSICIAN ASSISTANT

## 2022-05-28 PROCEDURE — 93005 ELECTROCARDIOGRAM TRACING: CPT | Performed by: PHYSICIAN ASSISTANT

## 2022-05-28 RX ORDER — ONDANSETRON 2 MG/ML
4 INJECTION INTRAMUSCULAR; INTRAVENOUS ONCE
Status: COMPLETED | OUTPATIENT
Start: 2022-05-28 | End: 2022-05-28

## 2022-05-28 RX ORDER — TAMSULOSIN HYDROCHLORIDE 0.4 MG/1
0.4 CAPSULE ORAL ONCE
Status: COMPLETED | OUTPATIENT
Start: 2022-05-28 | End: 2022-05-28

## 2022-05-28 RX ORDER — OXYCODONE AND ACETAMINOPHEN 10; 325 MG/1; MG/1
1 TABLET ORAL EVERY 6 HOURS PRN
Qty: 12 TABLET | Refills: 0 | Status: SHIPPED | OUTPATIENT
Start: 2022-05-28 | End: 2022-07-07

## 2022-05-28 RX ORDER — HYDROMORPHONE HYDROCHLORIDE 1 MG/ML
0.5 INJECTION, SOLUTION INTRAMUSCULAR; INTRAVENOUS; SUBCUTANEOUS ONCE
Status: COMPLETED | OUTPATIENT
Start: 2022-05-28 | End: 2022-05-28

## 2022-05-28 RX ORDER — ONDANSETRON 2 MG/ML
INJECTION INTRAMUSCULAR; INTRAVENOUS
Status: COMPLETED
Start: 2022-05-28 | End: 2022-05-28

## 2022-05-28 RX ORDER — TAMSULOSIN HYDROCHLORIDE 0.4 MG/1
1 CAPSULE ORAL DAILY
Qty: 14 CAPSULE | Refills: 0 | Status: SHIPPED | OUTPATIENT
Start: 2022-05-28 | End: 2022-07-07

## 2022-05-28 RX ORDER — SODIUM CHLORIDE 0.9 % (FLUSH) 0.9 %
10 SYRINGE (ML) INJECTION AS NEEDED
Status: DISCONTINUED | OUTPATIENT
Start: 2022-05-28 | End: 2022-05-28 | Stop reason: HOSPADM

## 2022-05-28 RX ORDER — ONDANSETRON 4 MG/1
4 TABLET, ORALLY DISINTEGRATING ORAL EVERY 6 HOURS PRN
Qty: 10 TABLET | Refills: 0 | Status: SHIPPED | OUTPATIENT
Start: 2022-05-28 | End: 2022-07-07

## 2022-05-28 RX ADMIN — HYDROMORPHONE HYDROCHLORIDE 0.5 MG: 1 INJECTION, SOLUTION INTRAMUSCULAR; INTRAVENOUS; SUBCUTANEOUS at 10:36

## 2022-05-28 RX ADMIN — SODIUM CHLORIDE 1000 ML: 9 INJECTION, SOLUTION INTRAVENOUS at 10:42

## 2022-05-28 RX ADMIN — ONDANSETRON 4 MG: 2 INJECTION INTRAMUSCULAR; INTRAVENOUS at 10:37

## 2022-05-28 RX ADMIN — TAMSULOSIN HYDROCHLORIDE 0.4 MG: 0.4 CAPSULE ORAL at 12:33

## 2022-06-01 ENCOUNTER — OFFICE VISIT (OUTPATIENT)
Dept: UROLOGY | Facility: CLINIC | Age: 52
End: 2022-06-01

## 2022-06-01 VITALS — BODY MASS INDEX: 33.49 KG/M2 | WEIGHT: 275 LBS | HEIGHT: 76 IN

## 2022-06-01 DIAGNOSIS — R10.9 ACUTE RIGHT FLANK PAIN: ICD-10-CM

## 2022-06-01 DIAGNOSIS — N20.0 KIDNEY STONES: ICD-10-CM

## 2022-06-01 DIAGNOSIS — N20.1 RIGHT DISTAL URETERAL CALCULUS: Primary | ICD-10-CM

## 2022-06-01 PROCEDURE — 82365 CALCULUS SPECTROSCOPY: CPT | Performed by: NURSE PRACTITIONER

## 2022-06-01 PROCEDURE — 99204 OFFICE O/P NEW MOD 45 MIN: CPT | Performed by: NURSE PRACTITIONER

## 2022-06-01 RX ORDER — ESCITALOPRAM OXALATE 10 MG/1
10 TABLET ORAL DAILY
COMMUNITY
Start: 2022-03-24

## 2022-06-01 NOTE — PATIENT INSTRUCTIONS
"Textbook of Natural Medicine (5th ed., pp. 5286-6120.e3). [place], [place]: Elsevier.\">   Dietary Guidelines to Help Prevent Kidney Stones  Kidney stones are deposits of minerals and salts that form inside your kidneys. Your risk of developing kidney stones may be greater depending on your diet, your lifestyle, the medicines you take, and whether you have certain medical conditions. Most people can lower their chances of developing kidney stones by following the instructions below. Your dietitian may give you more specific instructions depending on your overall health and the type of kidney stones you tend to develop.  What are tips for following this plan?  Reading food labels    Choose foods with \"no salt added\" or \"low-salt\" labels. Limit your salt (sodium) intake to less than 1,500 mg a day.  Choose foods with calcium for each meal and snack. Try to eat about 300 mg of calcium at each meal. Foods that contain 200-500 mg of calcium a serving include:  8 oz (237 mL) of milk, calcium-fortifiednon-dairy milk, and calcium-fortifiedfruit juice. Calcium-fortified means that calcium has been added to these drinks.  8 oz (237 mL) of kefir, yogurt, and soy yogurt.  4 oz (114 g) of tofu.  1 oz (28 g) of cheese.  1 cup (150 g) of dried figs.  1 cup (91 g) of cooked broccoli.  One 3 oz (85 g) can of sardines or mackerel.  Most people need 1,000-1,500 mg of calcium a day. Talk to your dietitian about how much calcium is recommended for you.  Shopping  Buy plenty of fresh fruits and vegetables. Most people do not need to avoid fruits and vegetables, even if these foods contain nutrients that may contribute to kidney stones.  When shopping for convenience foods, choose:  Whole pieces of fruit.  Pre-made salads with dressing on the side.  Low-fat fruit and yogurt smoothies.  Avoid buying frozen meals or prepared deli foods. These can be high in sodium.  Look for foods with live cultures, such as yogurt and kefir.  Choose high-fiber " grains, such as whole-wheat breads, oat bran, and wheat cereals.  Cooking  Do not add salt to food when cooking. Place a salt shaker on the table and allow each person to add his or her own salt to taste.  Use vegetable protein, such as beans, textured vegetable protein (TVP), or tofu, instead of meat in pasta, casseroles, and soups.  Meal planning  Eat less salt, if told by your dietitian. To do this:  Avoid eating processed or pre-made food.  Avoid eating fast food.  Eat less animal protein, including cheese, meat, poultry, or fish, if told by your dietitian. To do this:  Limit the number of times you have meat, poultry, fish, or cheese each week. Eat a diet free of meat at least 2 days a week.  Eat only one serving each day of meat, poultry, fish, or seafood.  When you prepare animal protein, cut pieces into small portion sizes. For most meat and fish, one serving is about the size of the palm of your hand.  Eat at least five servings of fresh fruits and vegetables each day. To do this:  Keep fruits and vegetables on hand for snacks.  Eat one piece of fruit or a handful of berries with breakfast.  Have a salad and fruit at lunch.  Have two kinds of vegetables at dinner.  Limit foods that are high in a substance called oxalate. These include:  Spinach (cooked), rhubarb, beets, sweet potatoes, and Swiss chard.  Peanuts.  Potato chips, french fries, and baked potatoes with skin on.  Nuts and nut products.  Chocolate.  If you regularly take a diuretic medicine, make sure to eat at least 1 or 2 servings of fruits or vegetables that are high in potassium each day. These include:  Avocado.  Banana.  Orange, prune, carrot, or tomato juice.  Baked potato.  Cabbage.  Beans and split peas.  Lifestyle    Drink enough fluid to keep your urine pale yellow. This is the most important thing you can do. Spread your fluid intake throughout the day.  If you drink alcohol:  Limit how much you use to:  0-1 drink a day for women who  are not pregnant.  0-2 drinks a day for men.  Be aware of how much alcohol is in your drink. In the [place] one drink equals one 12 oz bottle of beer (355 mL), one 5 oz glass of wine (148 mL), or one 1½ oz glass of hard liquor (44 mL).  Lose weight if told by your health care provider. Work with your dietitian to find an eating plan and weight loss strategies that work best for you.    General information  Talk to your health care provider and dietitian about taking daily supplements. You may be told the following depending on your health and the cause of your kidney stones:  Not to take supplements with vitamin C.  To take a calcium supplement.  To take a daily probiotic supplement.  To take other supplements such as magnesium, fish oil, or vitamin B6.  Take over-the-counter and prescription medicines only as told by your health care provider. These include supplements.  What foods should I limit?  Limit your intake of the following foods, or eat them as told by your dietitian.  Vegetables  Spinach. Rhubarb. Beets. Canned vegetables. Pickles. Olives. Baked potatoes with skin.  Grains  Wheat bran. Baked goods. Salted crackers. Cereals high in sugar.  Meats and other proteins  Nuts. Nut butters. Large portions of meat, poultry, or fish. Salted, precooked, or cured meats, such as sausages, meat loaves, and hot dogs.  Dairy  Cheese.  Beverages  Regular soft drinks. Regular vegetable juice.  Seasonings and condiments  Seasoning blends with salt. Salad dressings. Soy sauce. Ketchup. Barbecue sauce.  Other foods  Canned soups. Canned pasta sauce. Casseroles. Pizza. Lasagna. Frozen meals. Potato chips. French fries.  The items listed above may not be a complete list of foods and beverages you should limit. Contact a dietitian for more information.  What foods should I avoid?  Talk to your dietitian about specific foods you should avoid based on the type of kidney stones you have and your overall  health.  Fruits  Grapefruit.  The item listed above may not be a complete list of foods and beverages you should avoid. Contact a dietitian for more information.  Summary  Kidney stones are deposits of minerals and salts that form inside your kidneys.  You can lower your risk of kidney stones by making changes to your diet.  The most important thing you can do is drink enough fluid. Drink enough fluid to keep your urine pale yellow.  Talk to your dietitian about how much calcium you should have each day, and eat less salt and animal protein as told by your dietitian.  This information is not intended to replace advice given to you by your health care provider. Make sure you discuss any questions you have with your health care provider.  Document Revised: 12/10/2020 Document Reviewed: 12/10/2020  [employer] Patient Education © 2021 [employer]  Low-Purine Eating Plan  A low-purine eating plan involves making food choices to limit your intake of purine. Purine is a kind of uric acid. Too much uric acid in your blood can cause certain conditions, such as gout and kidney stones. Eating a low-purine diet can help control these conditions.  What are tips for following this plan?  Reading food labels  Avoid foods with saturated or Trans fat.  Check the ingredient list of grains-based foods, such as bread and cereal, to make sure that they contain whole grains.  Check the ingredient list of sauces or soups to make sure they do not contain meat or fish.  When choosing soft drinks, check the ingredient list to make sure they do not contain high-fructose corn syrup.  Shopping    Buy plenty of fresh fruits and vegetables.  Avoid buying canned or fresh fish.  Buy dairy products labeled as low-fat or nonfat.  Avoid buying premade or processed foods. These foods are often high in fat, salt (sodium), and added sugar.    Cooking  Use olive oil instead of butter when cooking. Oils like olive oil, canola oil, and sunflower oil contain  healthy fats.  Meal planning  Learn which foods do or do not affect you. If you find out that a food tends to cause your gout symptoms to flare up, avoid eating that food. You can enjoy foods that do not cause problems. If you have any questions about a food item, talk with your dietitian or health care provider.  Limit foods high in fat, especially saturated fat. Fat makes it harder for your body to get rid of uric acid.  Choose foods that are lower in fat and are lean sources of protein.  General guidelines  Limit alcohol intake to no more than 1 drink a day for nonpregnant women and 2 drinks a day for men. One drink equals 12 oz of beer, 5 oz of wine, or 1½ oz of hard liquor. Alcohol can affect the way your body gets rid of uric acid.  Drink plenty of water to keep your urine clear or pale yellow. Fluids can help remove uric acid from your body.  If directed by your health care provider, take a vitamin C supplement.  Work with your health care provider and dietitian to develop a plan to achieve or maintain a healthy weight. Losing weight can help reduce uric acid in your blood.  What foods are recommended?  The items listed may not be a complete list. Talk with your dietitian about what dietary choices are best for you.  Foods low in purines  Foods low in purines do not need to be limited. These include:  All fruits.  All low-purine vegetables, pickles, and olives.  Breads, pasta, rice, cornbread, and popcorn. Cake and other baked goods.  All dairy foods.  Eggs, nuts, and nut butters.  Spices and condiments, such as salt, herbs, and vinegar.  Plant oils, butter, and margarine.  Water, sugar-free soft drinks, tea, coffee, and cocoa.  Vegetable-based soups, broths, sauces, and gravies.  Foods moderate in purines  Foods moderate in purines should be limited to the amounts listed.  ½ cup of asparagus, cauliflower, spinach, mushrooms, or green peas, each day.  2/3 cup uncooked oatmeal, each day.  ¼ cup dry wheat bran  or wheat germ, each day.  2-3 ounces of meat or poultry, each day.  4-6 ounces of shellfish, such as crab, lobster, oysters, or shrimp, each day.  1 cup cooked beans, peas, or lentils, each day.  Soup, broths, or bouillon made from meat or fish. Limit these foods as much as possible.  What foods are not recommended?  The items listed may not be a complete list. Talk with your dietitian about what dietary choices are best for you.  Limit your intake of foods high in purines, including:  Beer and other alcohol.  Meat-based gravy or sauce.  Canned or fresh fish, such as:  Anchovies, sardines, herring, and tuna.  Mussels and scallops.  Codfish, trout, and darwin.  Meza.  Organ meats, such as:  Liver or kidney.  Tripe.  Sweetbreads (thymus gland or pancreas).  Wild game or goose.  Yeast or yeast extract supplements.  Drinks sweetened with high-fructose corn syrup.  Summary  Eating a low-purine diet can help control conditions caused by too much uric acid in the body, such as gout or kidney stones.  Choose low-purine foods, limit alcohol, and limit foods high in fat.  You will learn over time which foods do or do not affect you. If you find out that a food tends to cause your gout symptoms to flare up, avoid eating that food.  This information is not intended to replace advice given to you by your health care provider. Make sure you discuss any questions you have with your health care provider.  Document Revised: 04/01/2021 Document Reviewed: 04/01/2021  [employer] Patient Education © 2021 [employer]  Discussed a kidney stone prevention diet to include increasing p.o. fluid intake, to at least 1 to 2 L of water daily.  She is to avoid caffeine products such as cola, coffee, and to avoid soft or soda drinks.  She is to decrease her sodium consumption as in  Fast foods, meza, salted nuts, canned foods, and smoked/cured foods. She is also to decrease her oxalate consumption, as in spinach, Pedro greens, and Rhubarb.   Also important is to decrease protein intake, as in red meats, peanut butter, and also avoid nuts.

## 2022-06-01 NOTE — PROGRESS NOTES
"Chief Complaint  Nephrolithiasis (ER Follow up ) and RIGHT URETERAL CAUCULUS/RIGHT FLANK PAIN (NEW PT WITH 4.5 MM RIGHT UVJ STONE)    Subjective        Lloyd Colby presents to [facility] GASTROENTEROLOGY & UROLOGY  History of Present Illness    MR LLOYD COLBY is a Very pleasant 51 year old male patient, who presents to clinic today for evaluation. He is an ER follow-up for kidney stones. , this is his very first stone.  Patient states he presented to the [facility] ED on Saturday morning 05/28/2022 secondary to very sharp 10/10, aching and very consistent right flank pain radiating to his RLQ of his abdomen and suprapubic region.  He states he had significant testalgia, urinary symptoms of frequency, urgency, significant nausea without vomiting chills without any fevers.    He had a CT abdomen which showed an obstructing 4.1 mm calculus in the right distal ureter, causing moderate right hydronephrosis and hydroureter.  CT also showed a mild diffuse fatty infiltration of the liver, with no left renal or ureteral stones, with no hydronephrosis    On presentation to clinic today 06/01/2022,, he is in no apparent discomfort today and reports doing relatively well.  Patient reports he passed his stone and has brought it in for analysis.    HE has some mild dysuria, but denies any burning with urination, urinary frequency, urgency, or episodes of gross/microscopic hematuria.  Denies pelvic pressure or suprapubic discomfort, flank pain, back pain, he does not have any CVA tenderness.    The rest of his PMHx as listed below    Objective   Vital Signs:  Ht 193 cm (76\")   Wt 125 kg (275 lb)   BMI 33.47 kg/m²     BMI has not been calculated during today's encounter.     Physical Exam  Constitutional:       General: He is not in acute distress.     Appearance: He is well-developed. He is obese.   HENT:      Head: Normocephalic and atraumatic.      Right Ear: External ear normal.      Left Ear: " External ear normal.   Eyes:      General:         Right eye: No discharge.         Left eye: No discharge.      Conjunctiva/sclera: Conjunctivae normal.      Pupils: Pupils are equal, round, and reactive to light.   Neck:      Thyroid: No thyromegaly.      Trachea: No tracheal deviation.   Cardiovascular:      Rate and Rhythm: Normal rate and regular rhythm.      Heart sounds: No murmur heard.    No friction rub.   Pulmonary:      Effort: Pulmonary effort is normal. No respiratory distress.      Breath sounds: Normal breath sounds. No stridor.   Abdominal:      General: Bowel sounds are normal. There is no distension.      Palpations: Abdomen is soft.      Tenderness: There is no abdominal tenderness. There is no guarding.   Genitourinary:     Penis: Normal and uncircumcised. No tenderness or discharge.       Testes: Normal.      Rectum: Normal. Guaiac result negative.   Musculoskeletal:         General: No tenderness or deformity. Normal range of motion.      Cervical back: Normal range of motion and neck supple.   Skin:     General: Skin is warm and dry.      Capillary Refill: Capillary refill takes less than 2 seconds.      Coloration: Skin is not pale.   Neurological:      Mental Status: He is alert and oriented to person, place, and time.      Cranial Nerves: No cranial nerve deficit.      Coordination: Coordination normal.   Psychiatric:         Behavior: Behavior normal.         Thought Content: Thought content normal.         Judgment: Judgment normal.        Result Review :    CMP    CMP 9/24/21 5/28/22   Glucose 113 (A) 107 (A)   BUN 13 17   Creatinine 1.14 1.87 (A)   eGFR Non African Am 69    Sodium 140 140   Potassium 4.0 3.8   Chloride 106 103   Calcium 8.9 9.0   Albumin 4.10 4.13   Total Bilirubin 0.6 1.1   Alkaline Phosphatase 79 77   AST (SGOT) 16 12   ALT (SGPT) 17 14   (A) Abnormal value       Comments are available for some flowsheets but are not being displayed.           CBC w/diff    CBC  w/Diff 9/24/21 5/28/22   WBC 8.95 10.65   RBC 6.15 (A) 6.01 (A)   Hemoglobin 17.3 17.0   Hematocrit 52.0 (A) 51.8 (A)   MCV 84.6 86.2   MCH 28.1 28.3   MCHC 33.3 32.8   RDW 13.5 13.2   Platelets 245 227   Neutrophil Rel %  69.4   Immature Granulocyte Rel %  0.6 (A)   Lymphocyte Rel %  19.4 (A)   Monocyte Rel %  7.7   Eosinophil Rel %  2.3   Basophil Rel %  0.6   (A) Abnormal value            Renal Profile    Renal Profile 9/24/21 5/28/22   BUN 13 17   Creatinine 1.14 1.87 (A)   eGFR Non  Am 69    (A) Abnormal value            UA    Urinalysis 5/28/22   Specific [place], UA 1.021   Ketones, UA Negative   Blood, UA Negative   Leukocytes, UA Negative   Nitrite, UA Negative               Data reviewed: Radiologic studies CT abdomen and pelvis done 05/20/2022 showing mild right hydronephrosis secondary to 4.1 mm stone in right UVJ-resolved          Assessment and Plan   Diagnoses and all orders for this visit:    1. Right distal ureteral calculus (Primary)    2. Kidney stones  -     STONE ANALYSIS - Calculus,; Future  -     STONE ANALYSIS - Calculus, Voided    3. Acute right flank pain      RIGHT FLANK PAIN/RIGHT 4.1 MM DISTAL UVJ STONE-RESOLVED  MR DONAVON COLBY is a Very pleasant 51 year old male patient, who presents to clinic today for evaluation. He is an ER follow-up for kidney stones.  Patient had a CT that showed 4.1 mm distal UVJ stone, but upon evaluation in clinic today he reports passing his stone, and has brought it in for analysis.     We further discussed a kidney stone prevention diet to include increasing p.o. fluid intake, to at least 1 to 2 L of water daily.  He is to avoid caffeine products such as cola, coffee, and to avoid soft or soda drinks. We also discussed the importance of decreasing his  sodium consumption as in  Fast foods, meza, salted nuts, canned foods, and smoked/cured foods.      He is also to decrease his oxalate consumption, as in spinach, Pedro greens, and Rhubarb.  Also  important is to decrease protein intake, as in red meats, peanut butter, and also avoid nuts.    HE will follow-up in clinic as discussed , or see his PCP for yearly PSA results.    Patient is agreeable to plan of care,       Follow Up   Return if symptoms worsen or fail to improve, for Next scheduled follow up FO KIDNEY STONES/FLANK PAIN.  Patient was given instructions and counseling regarding his condition or for health maintenance advice. Please see specific information pulled into the AVS if appropriate.

## 2022-06-06 LAB
COLOR STONE: NORMAL
COM MFR STONE: 100 %
COMPN STONE: NORMAL
LABORATORY COMMENT REPORT: NORMAL
Lab: NORMAL
Lab: NORMAL
PHOTO: NORMAL
SIZE STONE: NORMAL MM
SPEC SOURCE SUBJ: NORMAL
WT STONE: 22 MG

## 2022-07-06 NOTE — PROGRESS NOTES
[facility] Cardiology  Follow Up Visit  Lloyd Loza  1970    VISIT DATE:  07/06/22    PCP:   Sintia Ly MD  [facility] [address] [place] [address]          CC:  No chief complaint on file.      Problem List:  1. Atrial fibrillation   a. New diagnosis April 2021 while hospitalized for gastroenteritis, asymptomatic   b. Flecainide and Metoprolol added and discontinued after reported 2 sec. pauses  c. Echo 4/19/21: EF 51-55%, left atrium is enlarged, mild MR  d. CHADsVASC=0, anticoagulated with Xarelto         2. Obstructive sleep apnea on CPAP   3. Psoriatic arthritis   4. Degenerative disc disease with chronic back pain         History of Present Illness:  Lloyd Loza  Is a 51 y.o. male with pertinent cardiac history detailed above.  Patient is in A. fib with a slow ventricular response today.  He remains completely asymptomatic.  He was on flecainide at one point when hospitalized in April but that was stopped due to sinus pauses.  His current metoprolol dosage is 25 mg twice daily.  We will cut this down to 12-1/2 mg twice daily to allow higher rates.  He has an echo scheduled for August to assess his EF and A. fib.  No other issues or concerns      Patient Active Problem List    Diagnosis Date Noted   • Atrial fibrillation (HCC) 09/24/2021   • Small bowel obstruction (HCC) 04/19/2021       Allergies   Allergen Reactions   • Penicillins Hives       Social History     Socioeconomic History   • Marital status:    Tobacco Use   • Smoking status: Never Smoker   • Smokeless tobacco: Never Used   Vaping Use   • Vaping Use: Never used   Substance and Sexual Activity   • Alcohol use: Never   • Drug use: Never   • Sexual activity: Defer       Family History   Problem Relation Age of Onset   • No Known Problems Mother    • Heart attack Father    • Heart disease Father        Current Medications:    Current Outpatient Medications:   •  escitalopram (LEXAPRO) 10 MG tablet, Take 10  mg by mouth Daily., Disp: , Rfl:   •  Guselkumab (Tremfya) 100 MG/ML solution pen-injector, Inject 100 mg under the skin into the appropriate area as directed Take As Directed. Every 8 weeks, Disp: , Rfl:   •  hydrOXYzine (ATARAX) 25 MG tablet, Take 25 mg by mouth At Night As Needed for Itching., Disp: , Rfl:   •  meloxicam (MOBIC) 7.5 MG tablet, Take 7.5 mg by mouth every night at bedtime., Disp: , Rfl:   •  metFORMIN (GLUCOPHAGE) 1000 MG tablet, Take 1,000 mg by mouth 2 (Two) Times a Day., Disp: , Rfl:   •  metoprolol tartrate (LOPRESSOR) 25 MG tablet, Take 1 tablet by mouth 2 (Two) Times a Day., Disp: 60 tablet, Rfl: 11  •  ondansetron ODT (ZOFRAN-ODT) 4 MG disintegrating tablet, Place 1 tablet on the tongue Every 6 (Six) Hours As Needed for Nausea or Vomiting., Disp: 10 tablet, Rfl: 0  •  oxyCODONE-acetaminophen (PERCOCET)  MG per tablet, Take 1 tablet by mouth Every 6 (Six) Hours As Needed for Severe Pain ., Disp: 12 tablet, Rfl: 0  •  rivaroxaban (XARELTO) 20 MG tablet, Take 1 tablet by mouth Daily With Dinner. Indications: Atrial Fibrillation, Disp: 90 tablet, Rfl: 3  •  tamsulosin (FLOMAX) 0.4 MG capsule 24 hr capsule, Take 1 capsule by mouth Daily., Disp: 14 capsule, Rfl: 0     Review of Systems   Cardiovascular: Negative for chest pain, dyspnea on exertion, irregular heartbeat, palpitations and syncope.   Hematologic/Lymphatic: Does not bruise/bleed easily.   Musculoskeletal: Positive for arthritis.       There were no vitals filed for this visit.    Physical Exam  Constitutional:       Appearance: Normal appearance.   Cardiovascular:      Rate and Rhythm: Bradycardia present. Rhythm irregular.      Pulses: Normal pulses.      Heart sounds: Normal heart sounds.   Pulmonary:      Effort: Pulmonary effort is normal.      Breath sounds: Normal breath sounds.   Musculoskeletal:      Right lower leg: No edema.      Left lower leg: No edema.   Neurological:      General: No focal deficit present.       Mental Status: He is alert.         Diagnostic Data:    ECG 12 Lead    Date/Time: 7/7/2022 10:04 AM  Performed by: Alok Giron MD  Authorized by: Alok Giron MD   Comparison: compared with previous ECG from 5/28/2022  Similar to previous ECG  Rhythm: atrial fibrillation  Rate: bradycardic  BPM: 47  QRS axis: normal    Clinical impression: abnormal EKG          Lab Results   Component Value Date    TRIG 150 04/20/2021    HDL 21 (L) 04/20/2021     Lab Results   Component Value Date    GLUCOSE 107 (H) 05/28/2022    BUN 17 05/28/2022    CREATININE 1.87 (H) 05/28/2022     05/28/2022    K 3.8 05/28/2022     05/28/2022    CO2 26.6 05/28/2022     No results found for: HGBA1C  Lab Results   Component Value Date    WBC 10.65 05/28/2022    HGB 17.0 05/28/2022    HCT 51.8 (H) 05/28/2022     05/28/2022       Assessment:  No diagnosis found.    Plan:       1.  Atrial fibrillation  -On Xarelto,  [id number] 0  -Bradycardic rates today cut metoprolol down to 12.5mg BID  -Cardioversion in September to sinus rhythm but back in atrial fibrillation at last visit January and today  -reported 2-second pauses while on metoprolol/flecainide.  Flecainide was stopped  -Needs repeat echo to reevaluate EF, this is scheduled for August.  If there are signs of negative adverse remodeling we will discuss further attempts at sinus rhythm including potential EP consultation     2.  Sleep apnea, on CPAP  -Reports compliance     3.  Psoriatic arthritis  -Follows with rheumatology      Alok Giron MD [facility]

## 2022-07-07 ENCOUNTER — OFFICE VISIT (OUTPATIENT)
Dept: CARDIOLOGY | Facility: CLINIC | Age: 52
End: 2022-07-07

## 2022-07-07 VITALS
OXYGEN SATURATION: 98 % | BODY MASS INDEX: 35.19 KG/M2 | HEART RATE: 54 BPM | HEIGHT: 76 IN | WEIGHT: 289 LBS | DIASTOLIC BLOOD PRESSURE: 70 MMHG | SYSTOLIC BLOOD PRESSURE: 138 MMHG

## 2022-07-07 DIAGNOSIS — I48.91 ATRIAL FIBRILLATION, UNSPECIFIED TYPE: Primary | ICD-10-CM

## 2022-07-07 PROCEDURE — 99214 OFFICE O/P EST MOD 30 MIN: CPT | Performed by: INTERNAL MEDICINE

## 2022-07-07 PROCEDURE — 93000 ELECTROCARDIOGRAM COMPLETE: CPT | Performed by: INTERNAL MEDICINE

## 2022-08-09 ENCOUNTER — OUTSIDE FACILITY SERVICE (OUTPATIENT)
Dept: CARDIOLOGY | Facility: CLINIC | Age: 52
End: 2022-08-09

## 2022-08-09 PROCEDURE — 93306 TTE W/DOPPLER COMPLETE: CPT | Performed by: INTERNAL MEDICINE

## 2022-10-06 ENCOUNTER — HOSPITAL ENCOUNTER (OUTPATIENT)
Dept: GENERAL RADIOLOGY | Facility: HOSPITAL | Age: 52
Discharge: HOME | End: 2022-10-06

## 2022-10-06 ENCOUNTER — TRANSCRIBE ORDERS (OUTPATIENT)
Dept: LAB | Facility: HOSPITAL | Age: 52
End: 2022-10-06

## 2022-10-06 DIAGNOSIS — M25.562 LEFT KNEE PAIN, UNSPECIFIED CHRONICITY: ICD-10-CM

## 2022-10-06 DIAGNOSIS — M25.512 LEFT SHOULDER PAIN, UNSPECIFIED CHRONICITY: ICD-10-CM

## 2022-10-06 DIAGNOSIS — M25.512 LEFT SHOULDER PAIN, UNSPECIFIED CHRONICITY: Primary | ICD-10-CM

## 2022-10-06 PROCEDURE — 73030 X-RAY EXAM OF SHOULDER: CPT

## 2022-10-06 PROCEDURE — 73562 X-RAY EXAM OF KNEE 3: CPT | Performed by: RADIOLOGY

## 2022-10-06 PROCEDURE — 73562 X-RAY EXAM OF KNEE 3: CPT

## 2022-10-06 PROCEDURE — 73030 X-RAY EXAM OF SHOULDER: CPT | Performed by: RADIOLOGY

## 2023-03-20 RX ORDER — RIVAROXABAN 20 MG/1
TABLET, FILM COATED ORAL
Qty: 90 TABLET | Refills: 3 | Status: SHIPPED | OUTPATIENT
Start: 2023-03-20

## 2023-05-31 NOTE — PROGRESS NOTES
[facility] Cardiology  Follow Up Visit  Lloyd Loza  1970    VISIT DATE:  06/01/23    PCP:   Sintia Ly MD  [facility] [address] [place] [address]          CC:  Atrial fibrillation, unspecified type (6 MONTH FOLLOW UP)      Problem List:  1. Atrial fibrillation   a. New diagnosis April 2021 while hospitalized for gastroenteritis, asymptomatic   b. Flecainide and Metoprolol added and discontinued after reported 2 sec. pauses  c. Echo 4/19/21: EF 51-55%, left atrium is enlarged, mild MR  d. CHADsVASC=0, anticoagulated with Xarelto         2. Obstructive sleep apnea on CPAP   3. Psoriatic arthritis   4. Degenerative disc disease with chronic back pain     Echo August 2022  EF greater than 55%, borderline left atrial enlargement.  Trace mitral and tricuspid regurg    History of Present Illness:  Lloyd Loza  Is a 52 y.o. male with pertinent cardiac history detailed above.  Patient does remain in atrial fibrillation today and is rate controlled.  Asymptomatic with no chest pain dyspnea lower extremity edema.  He had a prior cardioversion but did not maintain sinus rhythm for significant amount of time.  When he was placed on flecainide during the hospitalization of [facility] in the past he had frequent sinus pauses.  No other changes in medical history since last visit.  He is tolerating Xarelto well      Patient Active Problem List    Diagnosis Date Noted   • Atrial fibrillation (HCC) 09/24/2021   • Small bowel obstruction 04/19/2021       Allergies   Allergen Reactions   • Penicillins Hives       Social History     Socioeconomic History   • Marital status:    Tobacco Use   • Smoking status: Never   • Smokeless tobacco: Never   Vaping Use   • Vaping Use: Never used   Substance and Sexual Activity   • Alcohol use: Never   • Drug use: Never   • Sexual activity: Defer       Family History   Problem Relation Age of Onset   • No Known Problems Mother    • Heart attack  "Father    • Heart disease Father        Current Medications:    Current Outpatient Medications:   •  cyclobenzaprine (FLEXERIL) 10 MG tablet, 1 tablet As Needed., Disp: , Rfl:   •  escitalopram (LEXAPRO) 10 MG tablet, Take 1 tablet by mouth Daily., Disp: , Rfl:   •  melatonin 5 MG tablet tablet, Take 1 tablet by mouth As Needed., Disp: , Rfl:   •  metFORMIN (GLUCOPHAGE) 1000 MG tablet, Take 1 tablet by mouth 2 (Two) Times a Day., Disp: , Rfl:   •  metoprolol tartrate (LOPRESSOR) 25 MG tablet, Take 1 tablet by mouth 2 (Two) Times a Day., Disp: 180 tablet, Rfl: 0  •  Skyrizi 150 MG/ML solution prefilled syringe, Every 3 (Three) Months., Disp: , Rfl:   •  triamcinolone (KENALOG) 0.1 % ointment, As Needed., Disp: , Rfl:   •  Xarelto 20 MG tablet, TAKE 1 TABLET BY MOUTH DAILY WITH DINNER. INDICATIONS: ATRIAL FIBRILLATION, Disp: 90 tablet, Rfl: 3     Review of Systems   Cardiovascular: Negative.    Respiratory: Negative.        Vitals:    06/01/23 0920   BP: 134/70   BP Location: Right arm   Patient Position: Sitting   Pulse: 73   SpO2: 96%   Weight: 136 kg (300 lb)   Height: 193 cm (76\")       Physical Exam  Constitutional:       Appearance: Normal appearance.   Cardiovascular:      Rate and Rhythm: Normal rate. Rhythm irregular.      Pulses: Normal pulses.      Heart sounds: Normal heart sounds.   Pulmonary:      Effort: Pulmonary effort is normal.      Breath sounds: Normal breath sounds.   Musculoskeletal:      Right lower leg: No edema.      Left lower leg: No edema.   Neurological:      Mental Status: He is alert.         Diagnostic Data:    ECG 12 Lead    Date/Time: 6/1/2023 9:40 AM  Performed by: Alok Giron MD  Authorized by: Alok Giron MD   Comparison: compared with previous ECG from 7/7/2022  Similar to previous ECG  Rhythm: atrial fibrillation  Rate: normal  BPM: 64  T inversion: III  QRS axis: normal            Lab Results   Component Value Date    TRIG 150 04/20/2021    HDL 21 (L) " 04/20/2021     Lab Results   Component Value Date    GLUCOSE 107 (H) 05/28/2022    BUN 17 05/28/2022    CREATININE 1.87 (H) 05/28/2022     05/28/2022    K 3.8 05/28/2022     05/28/2022    CO2 26.6 05/28/2022     No results found for: HGBA1C  Lab Results   Component Value Date    WBC 10.65 05/28/2022    HGB 17.0 05/28/2022    HCT 51.8 (H) 05/28/2022     05/28/2022       Assessment:  No diagnosis found.    Plan:    1.  Persistent atrial fibrillation  -On Xarelto due to persistent afib,  [id number] 0.  Discussed risk and benefits of AC, he is agreeable to continue  -Continue metoprolol 25 mg twice daily  -Cardioversion in September 2021 quick return of atrial fibrillation -frequent reported sinus  pauses while on metoprolol/flecainide.  During admission 2021 Flecainide was stopped  -His EF was preserved on last echo while in A-fib, he is asymptomatic, we will continue with rate control strategy at present with periodic monitoring of echocardiogram     2.  Sleep apnea, on CPAP  -Reports compliance     3.  Psoriatic arthritis  -Follows with rheumatology    Follow-up in 1 year or sooner as needed    Alok Giron MD [facility]

## 2023-06-01 ENCOUNTER — OFFICE VISIT (OUTPATIENT)
Dept: CARDIOLOGY | Facility: CLINIC | Age: 53
End: 2023-06-01

## 2023-06-01 VITALS
OXYGEN SATURATION: 96 % | HEIGHT: 76 IN | WEIGHT: 300 LBS | BODY MASS INDEX: 36.53 KG/M2 | SYSTOLIC BLOOD PRESSURE: 134 MMHG | DIASTOLIC BLOOD PRESSURE: 70 MMHG | HEART RATE: 73 BPM

## 2023-06-01 DIAGNOSIS — I48.91 ATRIAL FIBRILLATION, UNSPECIFIED TYPE: Primary | ICD-10-CM

## 2023-06-01 PROCEDURE — 99214 OFFICE O/P EST MOD 30 MIN: CPT | Performed by: INTERNAL MEDICINE

## 2023-06-01 PROCEDURE — 93000 ELECTROCARDIOGRAM COMPLETE: CPT | Performed by: INTERNAL MEDICINE

## 2023-06-01 RX ORDER — TEMAZEPAM 7.5 MG/1
CAPSULE ORAL DAILY
COMMUNITY
Start: 2023-05-05 | End: 2023-06-01

## 2023-06-01 RX ORDER — RISANKIZUMAB-RZAA 150 MG/ML
INJECTION SUBCUTANEOUS
COMMUNITY
Start: 2023-03-08

## 2023-06-01 RX ORDER — CYCLOBENZAPRINE HCL 10 MG
10 TABLET ORAL AS NEEDED
COMMUNITY
Start: 2023-05-04

## 2023-06-01 RX ORDER — CHOLECALCIFEROL (VITAMIN D3) 125 MCG
5 CAPSULE ORAL AS NEEDED
COMMUNITY
Start: 2023-05-04

## 2023-06-01 RX ORDER — METOPROLOL TARTRATE 50 MG/1
TABLET, FILM COATED ORAL
COMMUNITY
Start: 2023-05-04 | End: 2023-06-01

## 2024-09-05 ENCOUNTER — OFFICE VISIT (OUTPATIENT)
Dept: CARDIOLOGY | Facility: CLINIC | Age: 54
End: 2024-09-05
Payer: COMMERCIAL

## 2024-09-05 VITALS
WEIGHT: 281.4 LBS | DIASTOLIC BLOOD PRESSURE: 74 MMHG | HEIGHT: 76 IN | BODY MASS INDEX: 34.27 KG/M2 | HEART RATE: 55 BPM | OXYGEN SATURATION: 100 % | SYSTOLIC BLOOD PRESSURE: 118 MMHG

## 2024-09-05 DIAGNOSIS — I10 HYPERTENSION, UNSPECIFIED TYPE: ICD-10-CM

## 2024-09-05 DIAGNOSIS — I48.91 ATRIAL FIBRILLATION, UNSPECIFIED TYPE: Primary | ICD-10-CM

## 2024-09-05 PROCEDURE — 93000 ELECTROCARDIOGRAM COMPLETE: CPT | Performed by: INTERNAL MEDICINE

## 2024-09-05 PROCEDURE — 99214 OFFICE O/P EST MOD 30 MIN: CPT | Performed by: INTERNAL MEDICINE

## 2024-09-05 RX ORDER — LISINOPRIL 10 MG/1
10 TABLET ORAL DAILY
COMMUNITY
Start: 2024-08-14

## 2024-09-05 NOTE — PROGRESS NOTES
[facility] Cardiology  Follow Up Visit  Lloyd Loza  1970    VISIT DATE:  09/05/24    PCP:   Sintia Ly MD  [facility] [address] [place] [address]          CC:  Atrial fibrillation, unspecified type      Atrial fibrillation   New diagnosis April 2021 while hospitalized for gastroenteritis, asymptomatic   Flecainide and Metoprolol added and discontinued after reported 2 sec. pauses  Echo 4/19/21: EF 51-55%, left atrium is enlarged, mild MR  CHADsVASC=0, anticoagulated with Xarelto         Obstructive sleep apnea on CPAP   Psoriatic arthritis   Degenerative disc disease with chronic back pain      Echo August 2022  EF greater than 55%, borderline left atrial enlargement.  Trace mitral and tricuspid regurgroblem List:    History of Present Illness:  Lloyd Loza  Is a 53 y.o. male with pertinent cardiac history detailed above.  Patient has persistent atrial fibrillation.  He feels well and is asymptomatic.  He is a retired schoolteacher.  No complaints of chest pain or dyspnea.  Because of persistent nature of A-fib he is anticoagulated with Xarelto and tolerated with no bleeding side effects.  Blood pressure is controlled.  His last echo in 2022 showed normal EF and no valve disease.      Patient Active Problem List    Diagnosis Date Noted    Atrial fibrillation 09/24/2021    Small bowel obstruction 04/19/2021       Allergies   Allergen Reactions    Penicillins Hives       Social History     Socioeconomic History    Marital status:    Tobacco Use    Smoking status: Never    Smokeless tobacco: Never   Vaping Use    Vaping status: Never Used   Substance and Sexual Activity    Alcohol use: Never    Drug use: Never    Sexual activity: Defer       Family History   Problem Relation Age of Onset    No Known Problems Mother     Heart attack Father     Heart disease Father        Current Medications:    Current Outpatient Medications:     cyclobenzaprine (FLEXERIL) 10 MG tablet, 1  "tablet As Needed., Disp: , Rfl:     escitalopram (LEXAPRO) 10 MG tablet, Take 1 tablet by mouth Daily., Disp: , Rfl:     lisinopril (PRINIVIL,ZESTRIL) 10 MG tablet, Take 1 tablet by mouth Daily., Disp: , Rfl:     melatonin 5 MG tablet tablet, Take 1 tablet by mouth As Needed., Disp: , Rfl:     metFORMIN (GLUCOPHAGE) 1000 MG tablet, Take 1 tablet by mouth 2 (Two) Times a Day., Disp: , Rfl:     metoprolol tartrate (LOPRESSOR) 25 MG tablet, Take 1 tablet by mouth 2 (Two) Times a Day., Disp: 180 tablet, Rfl: 0    Skyrizi 150 MG/ML solution prefilled syringe, Every 3 (Three) Months., Disp: , Rfl:     triamcinolone (KENALOG) 0.1 % ointment, As Needed., Disp: , Rfl:     Xarelto 20 MG tablet, TAKE 1 TABLET BY MOUTH DAILY WITH DINNER. INDICATIONS: ATRIAL FIBRILLATION, Disp: 90 tablet, Rfl: 3     Review of Systems   Cardiovascular: Negative.    Respiratory: Negative.         Vitals:    09/05/24 1311   BP: 118/74   BP Location: Left arm   Patient Position: Sitting   Pulse: 55   SpO2: 100%   Weight: 128 kg (281 lb 6.4 oz)   Height: 193 cm (76\")       Physical Exam  Constitutional:       Appearance: Normal appearance.   Neck:      Vascular: No carotid bruit.   Cardiovascular:      Rate and Rhythm: Bradycardia present. Rhythm irregular.      Pulses: Normal pulses.      Heart sounds: Normal heart sounds.   Pulmonary:      Effort: Pulmonary effort is normal.      Breath sounds: Normal breath sounds.   Musculoskeletal:      Right lower leg: No edema.      Left lower leg: No edema.   Neurological:      Mental Status: He is alert.         Diagnostic Data:    ECG 12 Lead    Date/Time: 9/5/2024 1:35 PM  Performed by: Alok Giron MD    Authorized by: Alok Giron MD  Comparison: compared with previous ECG from 1/6/2023  Rhythm: atrial fibrillation  Rate: bradycardic  BPM: 55  QRS axis: normal    Clinical impression: abnormal EKG        Lab Results   Component Value Date    TRIG 150 04/20/2021    HDL 21 (L) " "04/20/2021     Lab Results   Component Value Date    GLUCOSE 107 (H) 05/28/2022    BUN 17 05/28/2022    CREATININE 1.87 (H) 05/28/2022     05/28/2022    K 3.8 05/28/2022     05/28/2022    CO2 26.6 05/28/2022     No results found for: \"HGBA1C\"  Lab Results   Component Value Date    WBC 10.65 05/28/2022    HGB 17.0 05/28/2022    HCT 51.8 (H) 05/28/2022     05/28/2022       Assessment:   Diagnosis Plan   1. Atrial fibrillation, unspecified type  Adult Transthoracic Echo Complete W/ Cont if Necessary Per Protocol          Plan:    1.  Persistent atrial fibrillation  -On Xarelto due to persistent afib,  [id number] 0.   -Continue metoprolol 25 mg twice daily.  Rates are controlled  -Cardioversion in September 2021 quick return of atrial fibrillation -frequent reported sinus  pauses while on metoprolol/flecainide.  During admission 2021 Flecainide was stopped  -His EF was preserved on last echo while in A-fib, he is asymptomatic, we will continue with rate control strategy at present with periodic monitoring of echocardiogram  -Needs another echo to reassess EF.  This has been ordered     2.  Sleep apnea, on CPAP         ACP discussion was held with the patient during this visit. Patient does not have an advance directive, declines further assistance.      Alok Giron MD [facility]     "

## 2024-10-14 ENCOUNTER — HOSPITAL ENCOUNTER (OUTPATIENT)
Dept: CARDIOLOGY | Facility: HOSPITAL | Age: 54
Discharge: HOME | End: 2024-10-14
Admitting: INTERNAL MEDICINE
Payer: COMMERCIAL

## 2024-10-14 VITALS — BODY MASS INDEX: 34.22 KG/M2 | HEIGHT: 76 IN | WEIGHT: 281 LBS

## 2024-10-14 DIAGNOSIS — I48.91 ATRIAL FIBRILLATION, UNSPECIFIED TYPE: ICD-10-CM

## 2024-10-14 PROCEDURE — 93306 TTE W/DOPPLER COMPLETE: CPT

## 2024-10-14 PROCEDURE — 93306 TTE W/DOPPLER COMPLETE: CPT | Performed by: INTERNAL MEDICINE

## 2024-10-15 LAB
BH CV ECHO MEAS - AO MAX PG: 3.6 MMHG
BH CV ECHO MEAS - AO MEAN PG: 2 MMHG
BH CV ECHO MEAS - AO ROOT DIAM: 3.5 CM
BH CV ECHO MEAS - AO V2 MAX: 94.7 CM/SEC
BH CV ECHO MEAS - AO V2 VTI: 18.8 CM
BH CV ECHO MEAS - AVA(I,D): 2.9 CM2
BH CV ECHO MEAS - EDV(CUBED): 125 ML
BH CV ECHO MEAS - EDV(MOD-SP2): 131 ML
BH CV ECHO MEAS - EDV(MOD-SP4): 125 ML
BH CV ECHO MEAS - EF(MOD-BP): 55 %
BH CV ECHO MEAS - EF(MOD-SP2): 53.1 %
BH CV ECHO MEAS - EF(MOD-SP4): 55.1 %
BH CV ECHO MEAS - ESV(CUBED): 36.6 ML
BH CV ECHO MEAS - ESV(MOD-SP2): 61.4 ML
BH CV ECHO MEAS - ESV(MOD-SP4): 56.1 ML
BH CV ECHO MEAS - FS: 33.6 %
BH CV ECHO MEAS - IVS/LVPW: 1 CM
BH CV ECHO MEAS - IVSD: 1 CM
BH CV ECHO MEAS - LA DIMENSION: 4.4 CM
BH CV ECHO MEAS - LAT PEAK E' VEL: 14.1 CM/SEC
BH CV ECHO MEAS - LV DIASTOLIC VOL/BSA (35-75): 48.8 CM2
BH CV ECHO MEAS - LV MASS(C)D: 182 GRAMS
BH CV ECHO MEAS - LV MAX PG: 2.31 MMHG
BH CV ECHO MEAS - LV MEAN PG: 1 MMHG
BH CV ECHO MEAS - LV SYSTOLIC VOL/BSA (12-30): 21.9 CM2
BH CV ECHO MEAS - LV V1 MAX: 75.7 CM/SEC
BH CV ECHO MEAS - LV V1 VTI: 14 CM
BH CV ECHO MEAS - LVIDD: 5 CM
BH CV ECHO MEAS - LVIDS: 3.3 CM
BH CV ECHO MEAS - LVOT AREA: 3.8 CM2
BH CV ECHO MEAS - LVOT DIAM: 2.21 CM
BH CV ECHO MEAS - LVPWD: 1 CM
BH CV ECHO MEAS - MED PEAK E' VEL: 13.2 CM/SEC
BH CV ECHO MEAS - MV DEC SLOPE: 458.2 CM/SEC2
BH CV ECHO MEAS - MV DEC TIME: 0.14 SEC
BH CV ECHO MEAS - MV E MAX VEL: 68.1 CM/SEC
BH CV ECHO MEAS - MV MAX PG: 2.33 MMHG
BH CV ECHO MEAS - MV MEAN PG: 0.57 MMHG
BH CV ECHO MEAS - MV P1/2T: 48.1 MSEC
BH CV ECHO MEAS - MV V2 VTI: 13.5 CM
BH CV ECHO MEAS - MVA(P1/2T): 4.6 CM2
BH CV ECHO MEAS - MVA(VTI): 4 CM2
BH CV ECHO MEAS - PA ACC TIME: 0.11 SEC
BH CV ECHO MEAS - PA V2 MAX: 84.2 CM/SEC
BH CV ECHO MEAS - RAP SYSTOLE: 8 MMHG
BH CV ECHO MEAS - RVSP: 21 MMHG
BH CV ECHO MEAS - SV(LVOT): 53.6 ML
BH CV ECHO MEAS - SV(MOD-SP2): 69.6 ML
BH CV ECHO MEAS - SV(MOD-SP4): 68.9 ML
BH CV ECHO MEAS - SVI(LVOT): 21 ML/M2
BH CV ECHO MEAS - SVI(MOD-SP2): 27.2 ML/M2
BH CV ECHO MEAS - SVI(MOD-SP4): 26.9 ML/M2
BH CV ECHO MEAS - TAPSE (>1.6): 1.6 CM
BH CV ECHO MEAS - TR MAX PG: 13.3 MMHG
BH CV ECHO MEAS - TR MAX VEL: 182.4 CM/SEC
BH CV ECHO MEASUREMENTS AVERAGE E/E' RATIO: 4.99
BH CV XLRA - RV BASE: 4 CM
BH CV XLRA - RV LENGTH: 7 CM
BH CV XLRA - RV MID: 2.8 CM
BH CV XLRA - TDI S': 15.1 CM/SEC
LEFT ATRIUM VOLUME INDEX: 26 ML/M2

## 2024-10-29 ENCOUNTER — TELEPHONE (OUTPATIENT)
Dept: CARDIOLOGY | Facility: CLINIC | Age: 54
End: 2024-10-29
Payer: COMMERCIAL